# Patient Record
Sex: MALE | Race: BLACK OR AFRICAN AMERICAN | NOT HISPANIC OR LATINO
[De-identification: names, ages, dates, MRNs, and addresses within clinical notes are randomized per-mention and may not be internally consistent; named-entity substitution may affect disease eponyms.]

---

## 2017-08-09 ENCOUNTER — APPOINTMENT (OUTPATIENT)
Dept: CARDIOLOGY | Facility: CLINIC | Age: 62
End: 2017-08-09

## 2021-08-02 ENCOUNTER — APPOINTMENT (OUTPATIENT)
Dept: INTERNAL MEDICINE | Facility: CLINIC | Age: 66
End: 2021-08-02
Payer: MEDICAID

## 2021-08-04 ENCOUNTER — APPOINTMENT (OUTPATIENT)
Dept: INTERNAL MEDICINE | Facility: CLINIC | Age: 66
End: 2021-08-04
Payer: MEDICAID

## 2021-08-04 ENCOUNTER — LABORATORY RESULT (OUTPATIENT)
Age: 66
End: 2021-08-04

## 2021-08-04 ENCOUNTER — NON-APPOINTMENT (OUTPATIENT)
Age: 66
End: 2021-08-04

## 2021-08-04 VITALS
TEMPERATURE: 97 F | HEIGHT: 75 IN | BODY MASS INDEX: 22.5 KG/M2 | DIASTOLIC BLOOD PRESSURE: 84 MMHG | RESPIRATION RATE: 17 BRPM | HEART RATE: 81 BPM | OXYGEN SATURATION: 96 % | SYSTOLIC BLOOD PRESSURE: 136 MMHG | WEIGHT: 181 LBS

## 2021-08-04 DIAGNOSIS — N17.9 ACUTE KIDNEY FAILURE, UNSPECIFIED: ICD-10-CM

## 2021-08-04 DIAGNOSIS — E66.3 OVERWEIGHT: ICD-10-CM

## 2021-08-04 DIAGNOSIS — Z00.00 ENCOUNTER FOR GENERAL ADULT MEDICAL EXAMINATION W/OUT ABNORMAL FINDINGS: ICD-10-CM

## 2021-08-04 DIAGNOSIS — Z71.89 OTHER SPECIFIED COUNSELING: ICD-10-CM

## 2021-08-04 DIAGNOSIS — B19.20 UNSPECIFIED VIRAL HEPATITIS C W/OUT HEPATIC COMA: ICD-10-CM

## 2021-08-04 DIAGNOSIS — Z13.39 ENCOUNTER FOR SCREENING EXAM FOR OTHER MENTAL HEALTH AND BEHAVIORAL DISORDERS: ICD-10-CM

## 2021-08-04 DIAGNOSIS — Z80.9 FAMILY HISTORY OF MALIGNANT NEOPLASM, UNSPECIFIED: ICD-10-CM

## 2021-08-04 DIAGNOSIS — G62.9 POLYNEUROPATHY, UNSPECIFIED: ICD-10-CM

## 2021-08-04 DIAGNOSIS — Z87.828 PERSONAL HISTORY OF OTHER (HEALED) PHYSICAL INJURY AND TRAUMA: ICD-10-CM

## 2021-08-04 DIAGNOSIS — M54.30 SCIATICA, UNSPECIFIED SIDE: ICD-10-CM

## 2021-08-04 DIAGNOSIS — Z81.8 FAMILY HISTORY OF OTHER MENTAL AND BEHAVIORAL DISORDERS: ICD-10-CM

## 2021-08-04 DIAGNOSIS — F17.200 NICOTINE DEPENDENCE, UNSPECIFIED, UNCOMPLICATED: ICD-10-CM

## 2021-08-04 PROCEDURE — 99214 OFFICE O/P EST MOD 30 MIN: CPT | Mod: 25

## 2021-08-04 PROCEDURE — G0445: CPT | Mod: 59

## 2021-08-04 PROCEDURE — 99397 PER PM REEVAL EST PAT 65+ YR: CPT | Mod: 25

## 2021-08-04 PROCEDURE — G0442 ANNUAL ALCOHOL SCREEN 15 MIN: CPT

## 2021-08-04 PROCEDURE — G0442 ANNUAL ALCOHOL SCREEN 15 MIN: CPT | Mod: 59

## 2021-08-04 PROCEDURE — 93000 ELECTROCARDIOGRAM COMPLETE: CPT | Mod: 59

## 2021-08-04 RX ORDER — CHROMIUM 200 MCG
TABLET ORAL DAILY
Refills: 0 | Status: ACTIVE | COMMUNITY

## 2021-08-04 RX ORDER — BETAMETHASONE DIPROPIONATE 0.5 MG/G
0.05 CREAM TOPICAL TWICE DAILY
Refills: 0 | Status: ACTIVE | COMMUNITY

## 2021-08-04 RX ORDER — HALOBETASOL PROPIONATE 0.5 MG/G
0.05 CREAM TOPICAL TWICE DAILY
Refills: 0 | Status: ACTIVE | COMMUNITY

## 2021-08-05 ENCOUNTER — NON-APPOINTMENT (OUTPATIENT)
Age: 66
End: 2021-08-05

## 2021-08-05 LAB
25(OH)D3 SERPL-MCNC: 28 NG/ML
ALBUMIN SERPL ELPH-MCNC: 4.8 G/DL
ALP BLD-CCNC: 79 U/L
ALT SERPL-CCNC: 14 U/L
ANION GAP SERPL CALC-SCNC: 17 MMOL/L
AST SERPL-CCNC: 31 U/L
BASOPHILS # BLD AUTO: 0.09 K/UL
BASOPHILS NFR BLD AUTO: 1.2 %
BILIRUB SERPL-MCNC: 0.8 MG/DL
BUN SERPL-MCNC: 38 MG/DL
C TRACH RRNA SPEC QL NAA+PROBE: NOT DETECTED
CALCIUM SERPL-MCNC: 10.4 MG/DL
CHLORIDE SERPL-SCNC: 96 MMOL/L
CHOLEST SERPL-MCNC: 198 MG/DL
CO2 SERPL-SCNC: 26 MMOL/L
CREAT SERPL-MCNC: 2.31 MG/DL
EOSINOPHIL # BLD AUTO: 0.1 K/UL
EOSINOPHIL NFR BLD AUTO: 1.3 %
ESTIMATED AVERAGE GLUCOSE: 114 MG/DL
GGT SERPL-CCNC: 20 U/L
GLUCOSE SERPL-MCNC: 84 MG/DL
HBA1C MFR BLD HPLC: 5.6 %
HCT VFR BLD CALC: 38.8 %
HDLC SERPL-MCNC: 82 MG/DL
HGB BLD-MCNC: 13.8 G/DL
HSV 1+2 IGG SER IA-IMP: POSITIVE
HSV 1+2 IGG SER IA-IMP: POSITIVE
HSV1 IGG SER QL: 31.8 INDEX
HSV2 IGG SER QL: 1.74 INDEX
IMM GRANULOCYTES NFR BLD AUTO: 0.1 %
LDLC SERPL CALC-MCNC: 99 MG/DL
LYMPHOCYTES # BLD AUTO: 2.34 K/UL
LYMPHOCYTES NFR BLD AUTO: 30.6 %
MAN DIFF?: NORMAL
MCHC RBC-ENTMCNC: 29.3 PG
MCHC RBC-ENTMCNC: 35.6 GM/DL
MCV RBC AUTO: 82.4 FL
MONOCYTES # BLD AUTO: 1.02 K/UL
MONOCYTES NFR BLD AUTO: 13.4 %
N GONORRHOEA RRNA SPEC QL NAA+PROBE: NOT DETECTED
NEUTROPHILS # BLD AUTO: 4.08 K/UL
NEUTROPHILS NFR BLD AUTO: 53.4 %
NONHDLC SERPL-MCNC: 116 MG/DL
PLATELET # BLD AUTO: 237 K/UL
POTASSIUM SERPL-SCNC: 3.8 MMOL/L
PROT SERPL-MCNC: 7.3 G/DL
RBC # BLD: 4.71 M/UL
RBC # FLD: 14.5 %
SODIUM SERPL-SCNC: 140 MMOL/L
SOURCE AMPLIFICATION: NORMAL
SOURCE AMPLIFICATION: NORMAL
T PALLIDUM AB SER QL IA: NEGATIVE
T VAGINALIS RRNA SPEC QL NAA+PROBE: NOT DETECTED
TRIGL SERPL-MCNC: 89 MG/DL
TSH SERPL-ACNC: 1.05 UIU/ML
WBC # FLD AUTO: 7.64 K/UL

## 2021-08-06 LAB
APPEARANCE: ABNORMAL
BILIRUBIN URINE: NEGATIVE
BLOOD URINE: NEGATIVE
COLOR: YELLOW
GLUCOSE QUALITATIVE U: NEGATIVE
HBV SURFACE AB SER QL: REACTIVE
HBV SURFACE AG SER QL: NONREACTIVE
HCV AB SER QL: REACTIVE
HCV S/CO RATIO: 7.92 S/CO
HIV1+2 AB SPEC QL IA.RAPID: NONREACTIVE
KETONES URINE: NORMAL
LEUKOCYTE ESTERASE URINE: NEGATIVE
NITRITE URINE: NEGATIVE
PH URINE: 6
PROTEIN URINE: ABNORMAL
SPECIFIC GRAVITY URINE: 1.02
UROBILINOGEN URINE: NORMAL

## 2021-08-07 NOTE — ASSESSMENT
[FreeTextEntry1] : Medical Annual wellness visit completed:\par HRA completed and reviewed with patient\par Medical, family, surgical history reviewed with patient and updated\par List of current providers r/w patient and updated\par Vitals, BMI reviewed and discussed along with healthy BMI goals. Dietary counseling x 15 minutes provided\par Depression PHQ 9 completed and reviewed \par Annual safety assessment reviewed\par discussed advanced directives\par smoking cessation counseling provided\par Established routine screening and immunization schedules\par \par VACCINATION & OTHER TX RECOMMENDATIONS\par \par ASA preventative therapy\par Calcium/Vitamin D supplementation\par  \par Dietary counseling, nutrition referral\par risks vs. benefits d/w patient. routine vaccination and vaccination schedules and recommendation d/w patient\par \par Vaccines recommended: \par * pneumovax (once after 65) & prevnar\par * annual Influenza vaccine\par * Hep B vaccines\par * zostavax\par * Tdap\par \par Colorectal screening recommended; screening colonoscopy q10yr, flex sig q5yr, annual fecal occult testing\par BMD recommended biennially for osteoporosis screening\par Glaucoma screening recommended, annual optho evals\par Cardiovascular screening and blood tests recommended and discussed w/ patient, cholesterol screening and dietary counseling\par AAA recommended x 1\par \par \par DIABETIC recommendations:\par med nutrition counseling, nutrition referral \par annual podiatry evaluations and follow up\par annual optho eval/retinal exams\par routine blood tets, including FBS, a1c% and cholesterol panels\par \par \par Met with DOMENICA SAPP, who was willing to discuss advance care planning. \par Our advance care planning conversation included a discussion about:\par 1. The value and importance of advance care planning.\par 2. Experiences with loved ones who have been seriously ill or have .\par 3. Exploration of personal, cultural, or spiritual beliefs that might influence medical decisions.\par 4. Exploration of goals of care in the event of a sudden injury or illness.\par 5. Identification of a health care agent.\par 6. Review and update, or completion of, an advance directive.\par Start time: 125 End time: 140\par \par diet and exercise weight loss.  Low-salt low-fat ADA diet/ htn- Discussed diabetes physiology\par - Discussed importance of monitoring blood glucose levels\par - Encouraged a low fat/low cholesterol diet\par - Discussed symptoms of hyperglycemia and hypoglycemia\par - Discussed ADA glucose goals\par - Discussed  HGB A1c and the effects of blood glucose on the level\par - Discussed Healthy eating, avoidance of concentrated sweets, and to include vegetables by at least 2 meals a day\par - Discussed regular exercise\par - Discussed importance of follow up physician visits Limit intake of Sodium (Salt) to less than 2 grams a day to prevent fluid retention-swelling or worsening of symptoms. The importance of keeping the blood pressure at or below 130/80 to prevent stroke, heart attacks, kidney failure, blindness, and loss of limbs was  low chol diet. Avoid fried foods, red meat, butter, eggs, hard cheeses. Use canola or olive oil preferred. ::  was established in which goals would be set, monitoring would be done, and problem solving would also be addressed. The patient would be assisted using behavior change techniques, such as self-help and counseling through behavioral modification: Problem solving using hypnosis and positive medical reinforcement to achieve agreed-upon goals.\par \par Symptomatic patients : Test for influenza, if positive, treat for influenza and do not continue below. \par 1. Fever plus cough or shortness of breath : Test for RVP and COVID-19.\par 2.Indirect, circumstantial or unclear exposure to COVID-19, or other concerning cases not meeting above criteria: Please call [facility] to discuss testing. \par +++ All above cases must be reported to the [facility] registry. +++\par \par Asymptomatic patients: \par 1. Known first-degree direct-contact exposure to positive COVID-19 patient but asymptomatic: No testing PLUS 14 day self-quarantine. Pt to call if symptoms develop. Report to [facility] Registry.\par 2. No known exposure and asymptomatic, referred from outside healthcare organization: Please call [facility] to discuss testing. \par 3.All other asymptomatic patients with no known exposures: no testing, no exceptions.\par \par \par

## 2021-08-07 NOTE — COUNSELING
[Risk of tobacco use and health benefits of smoking cessation discussed] : Risk of tobacco use and health benefits of smoking cessation discussed [Cessation strategies including cessation program discussed] : Cessation strategies including cessation program discussed [Use of nicotine replacement therapies and other medications discussed] : Use of nicotine replacement therapies and other medications discussed [Encouraged to pick a quit date and identify support needed to quit] : Encouraged to pick a quit date and identify support needed to quit [Tobacco Use Cessation Intermediate Greater Than 3 Minutes Up to 10 Minutes] : Tobacco Use Cessation Intermediate Greater Than 3 Minutes Up to 10 Minutes [Patient motivation] : Patient motivation [Good understanding] : Patient has a good understanding of lifestyle changes and steps needed to achieve self management goal [Willing to Quit Smoking] : Not willing to quit smoking [FreeTextEntry1] : 10

## 2021-08-07 NOTE — HISTORY OF PRESENT ILLNESS
[Spouse] : spouse [FreeTextEntry1] : f/u, GHM, annual wellness, gluc intol, obesity, htn, lipids.  Patient recently admitted to the hospital and found to have pyelonephritis and acute renal failure, patient and wife state that he has lost excessive weight is fatigued and having difficulty with mentation [de-identified] : 64 y/o M presents for f/u, annual wellness, GHM\par \par Hx of Pyelonephritis/Renal failure in June 2021\par Pt has also been noting decreased appetite.\par Also notes R lower back pain, no trauma.  Weight loss change in mental status fatigue.  Has fever chills cough chest pain or shortness of breath\par \par Pt has hx of lipids, htn, glucose intolerance\par On Losartan/HCTZ\par Pt denies fever, cough, body aches, chills and SOB.\par Denies chest pain.\par \par ROS as documented below.\par Voices no further complaints.

## 2021-08-07 NOTE — HEALTH RISK ASSESSMENT
[20 or more] : 20 or more [Yes] : Yes [2 - 4 times a month (2 pts)] : 2-4 times a month (2 points) [1 or 2 (0 pts)] : 1 or 2 (0 points) [Never (0 pts)] : Never (0 points) [No] : In the past 12 months have you used drugs other than those required for medical reasons? No [No falls in past year] : Patient reported no falls in the past year [0] : 1) Little interest or pleasure doing things: Not at all (0) [1] : 2) Feeling down, depressed, or hopeless for several days (1) [PHQ-2 Negative - No further assessment needed] : PHQ-2 Negative - No further assessment needed [I have developed a follow-up plan documented below in the note.] : I have developed a follow-up plan documented below in the note. [Patient declined colonoscopy] : Patient declined colonoscopy [None] : None [With Significant Other] : lives with significant other [Retired] : retired [High School] : high school [] :  [Sexually Active] : sexually active [Fully functional (bathing, dressing, toileting, transferring, walking, feeding)] : Fully functional (bathing, dressing, toileting, transferring, walking, feeding) [Fully functional (using the telephone, shopping, preparing meals, housekeeping, doing laundry, using] : Fully functional and needs no help or supervision to perform IADLs (using the telephone, shopping, preparing meals, housekeeping, doing laundry, using transportation, managing medications and managing finances) [With Patient/Caregiver] : , with patient/caregiver [Designated Healthcare Proxy] : Designated healthcare proxy [Name: ___] : Health Care Proxy's Name: [unfilled]  [Relationship: ___] : Relationship: [unfilled] [Last Verification Date: ___] : Last Verification Date: [unfilled] [I will adhere to the patient's wishes.] : I will adhere to the patient's wishes. [Time Spent: ___ minutes] : Time Spent: [unfilled] minutes [Poor] : ~his/her~ mood as  poor [Intercurrent ED visits] : went to ED [Intercurrent hospitalizations] : was admitted to the hospital  [] : No [de-identified] : Hospitalized for pyelonephritis in June of this year [Audit-CScore] : 2 [de-identified] : None [de-identified] : Standard,  [[id number]] : 1 [Hepatitis C test offered] : Hepatitis C test offered [Change in mental status noted] : Change in mental status noted [Language] : denies difficulty with language [Behavior] : denies difficulty with behavior [Learning/Retaining New Information] : denies difficulty learning/retaining new information [Handling Complex Tasks] : denies difficulty handling complex tasks [Reasoning] : denies difficulty with reasoning [Spatial Ability and Orientation] : denies difficulty with spatial ability and orientation [Unemployed] : unemployed [High Risk Behavior] : no high risk behavior [Feels Safe at Home] : Feels safe at home [Reports changes in hearing] : Reports no changes in hearing [Reports changes in vision] : Reports no changes in vision [Reports changes in dental health] : Reports no changes in dental health [Carbon Monoxide Detector] : carbon monoxide detector [Guns at Home] : no guns at home [Safety elements used in home] : safety elements used in home [Seat Belt] :  uses seat belt [Sunscreen] : uses sunscreen [Travel to Developing Areas] : does not  travel to developing areas [TB Exposure] : is not being exposed to tuberculosis [Caregiver Concerns] : does not have caregiver concerns [ColonoscopyComments] : not UTD [HepatitisCDate] : ? [AdvancecareDate] : 08/21

## 2021-08-17 ENCOUNTER — APPOINTMENT (OUTPATIENT)
Dept: PHYSICAL MEDICINE AND REHAB | Facility: CLINIC | Age: 66
End: 2021-08-17
Payer: MEDICAID

## 2021-08-17 VITALS
HEIGHT: 75 IN | WEIGHT: 185.5 LBS | SYSTOLIC BLOOD PRESSURE: 126 MMHG | BODY MASS INDEX: 23.06 KG/M2 | DIASTOLIC BLOOD PRESSURE: 76 MMHG | RESPIRATION RATE: 16 BRPM | OXYGEN SATURATION: 95 % | TEMPERATURE: 97.1 F | HEART RATE: 77 BPM

## 2021-08-17 DIAGNOSIS — M79.671 PAIN IN RIGHT FOOT: ICD-10-CM

## 2021-08-17 DIAGNOSIS — M25.562 PAIN IN LEFT KNEE: ICD-10-CM

## 2021-08-17 PROCEDURE — 99205 OFFICE O/P NEW HI 60 MIN: CPT

## 2021-08-19 PROBLEM — M25.562 LEFT KNEE PAIN, UNSPECIFIED CHRONICITY: Status: ACTIVE | Noted: 2021-08-17

## 2021-08-19 PROBLEM — M79.671 ACUTE FOOT PAIN, RIGHT: Status: ACTIVE | Noted: 2021-08-17

## 2021-08-19 NOTE — REVIEW OF SYSTEMS
[Joint Pain] : joint pain [Joint Stiffness] : joint stiffness [Muscle Pain] : muscle pain [Muscle Weakness] : muscle weakness [Fever] : no fever [Eye Pain] : no eye pain [Earache] : no earache [Chest Pain] : no chest pain [Shortness Of Breath] : no shortness of breath [Abdominal Pain] : no abdominal pain [Dysuria] : no dysuria [Skin Wound] : no skin wound [Dizziness] : no dizziness [Insomnia] : no insomnia [Easy Bruising] : no tendency for easy bruising

## 2021-08-19 NOTE — REASON FOR VISIT
[Initial Evaluation] : an initial evaluation [FreeTextEntry1] : cervical, low back pain and right lateral foot and left knee pain
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
## 2021-08-19 NOTE — PHYSICAL EXAM
[FreeTextEntry1] : Pleasant, in no distress. Language: English\par HEENT: Head: no trauma. Eyes: no discharge. Ears: No discharge. Nose No discharge. Throat: clear\par Neck: AROM 0-60. spasm in the par cervical muscles Negative Spurlings\par Heart: RR, +S1, S2\par Lungs: CTA\par Abdomen: soft, NT\par Lumbar spine: FAROM, mild spasm over the right low back and SI joint\par \par LUE: Shoulder:FAROM, MS 5/5\par Elbow: FAROM, MS 5/5 reflexes 2/4\par Wrist: FAROM, MS 5/5 reflexes 2/4\par Warm, nontender, pulse 2+\par \par RUE:Shoulder:FAROM, MS 5/5\par Elbow: FAROM, MS 5/5 reflexes 2/4\par Wrist: FAROM, MS 5/5 reflexes 2/4\par Warm, nontender, pulse 2+\par \par LLE: Hip: FAROM, MS 5/5\par Knee: FAROM, MS 4/5  + grind. No instability. Neg mcmurays.\par reflexes 2/4\par Ankle: FAROM, MS 5/5 reflexes 2/4\par Warm , nontender, pulse 2+ negative homans\par \par RLE: Hip: FAROM, MS 5/5\par Knee: FAROM, MS 5/5 reflexes 2/4\par Ankle: FAROM, MS 5/5 reflexes 2/4\par Warm , nontender, pulse 2+ negative homans\par Very tender over the right lateral 5th MT. No rash\par \par Gait: Spontaneous, reciprocal, safe without an assistive device\par \par Sensation\par RUE: sensation is intact to light touch, pinprick  and proprioception\par LUE: sensation is intact to light touch, pinprick  and proprioception\par RLE: sensation is intact to light touch, pinprick  and proprioception. Neg SLR. Neg KIT, Neg FADIR\par LLE: sensation is intact to light touch, pinprick  and proprioception. Neg SLR. Neg KIT, Neg FADIR\par \par

## 2021-09-14 ENCOUNTER — APPOINTMENT (OUTPATIENT)
Dept: INTERNAL MEDICINE | Facility: CLINIC | Age: 66
End: 2021-09-14
Payer: MEDICAID

## 2021-09-14 VITALS
OXYGEN SATURATION: 98 % | BODY MASS INDEX: 21.88 KG/M2 | SYSTOLIC BLOOD PRESSURE: 126 MMHG | DIASTOLIC BLOOD PRESSURE: 72 MMHG | WEIGHT: 176 LBS | HEART RATE: 67 BPM | RESPIRATION RATE: 17 BRPM | HEIGHT: 75 IN | TEMPERATURE: 97.3 F

## 2021-09-14 DIAGNOSIS — R55 SYNCOPE AND COLLAPSE: ICD-10-CM

## 2021-09-14 DIAGNOSIS — R43.0 ANOSMIA: ICD-10-CM

## 2021-09-14 DIAGNOSIS — R63.0 ANOREXIA: ICD-10-CM

## 2021-09-14 PROCEDURE — 99214 OFFICE O/P EST MOD 30 MIN: CPT | Mod: 25

## 2021-09-20 PROBLEM — R55 SYNCOPE AND COLLAPSE: Status: ACTIVE | Noted: 2021-09-14

## 2021-09-20 PROBLEM — R63.0 POOR APPETITE: Status: ACTIVE | Noted: 2021-09-20

## 2021-09-20 PROBLEM — R43.0 ANOSMIA: Status: ACTIVE | Noted: 2021-09-20

## 2021-09-20 LAB
ALBUMIN SERPL ELPH-MCNC: 4.5 G/DL
ALP BLD-CCNC: 67 U/L
ALT SERPL-CCNC: 14 U/L
ANION GAP SERPL CALC-SCNC: 15 MMOL/L
AST SERPL-CCNC: 25 U/L
BASOPHILS # BLD AUTO: 0.08 K/UL
BASOPHILS NFR BLD AUTO: 1.2 %
BILIRUB SERPL-MCNC: 0.4 MG/DL
BUN SERPL-MCNC: 37 MG/DL
CALCIUM SERPL-MCNC: 10.3 MG/DL
CHLORIDE SERPL-SCNC: 98 MMOL/L
CO2 SERPL-SCNC: 26 MMOL/L
CREAT SERPL-MCNC: 2 MG/DL
EOSINOPHIL # BLD AUTO: 0.22 K/UL
EOSINOPHIL NFR BLD AUTO: 3.2 %
GLUCOSE SERPL-MCNC: 94 MG/DL
HCT VFR BLD CALC: 34.8 %
HGB BLD-MCNC: 12.5 G/DL
IMM GRANULOCYTES NFR BLD AUTO: 0.1 %
LYMPHOCYTES # BLD AUTO: 2.36 K/UL
LYMPHOCYTES NFR BLD AUTO: 34.6 %
MAN DIFF?: NORMAL
MCHC RBC-ENTMCNC: 29.5 PG
MCHC RBC-ENTMCNC: 35.9 GM/DL
MCV RBC AUTO: 82.1 FL
MONOCYTES # BLD AUTO: 1.05 K/UL
MONOCYTES NFR BLD AUTO: 15.4 %
NEUTROPHILS # BLD AUTO: 3.11 K/UL
NEUTROPHILS NFR BLD AUTO: 45.5 %
PLATELET # BLD AUTO: 235 K/UL
POTASSIUM SERPL-SCNC: 3.9 MMOL/L
PROT SERPL-MCNC: 7 G/DL
RBC # BLD: 4.24 M/UL
RBC # FLD: 13.9 %
SARS-COV-2 N GENE NPH QL NAA+PROBE: NOT DETECTED
SODIUM SERPL-SCNC: 139 MMOL/L
WBC # FLD AUTO: 6.83 K/UL

## 2021-09-20 NOTE — PHYSICAL EXAM
[No Acute Distress] : no acute distress [Well Nourished] : well nourished [Well Developed] : well developed [Well-Appearing] : well-appearing [Normal Sclera/Conjunctiva] : normal sclera/conjunctiva [EOMI] : extraocular movements intact [PERRL] : pupils equal round and reactive to light [Normal Outer Ear/Nose] : the outer ears and nose were normal in appearance [Normal Oropharynx] : the oropharynx was normal [No JVD] : no jugular venous distention [No Lymphadenopathy] : no lymphadenopathy [Supple] : supple [Thyroid Normal, No Nodules] : the thyroid was normal and there were no nodules present [No Respiratory Distress] : no respiratory distress  [No Accessory Muscle Use] : no accessory muscle use [Clear to Auscultation] : lungs were clear to auscultation bilaterally [Normal Rate] : normal rate  [Regular Rhythm] : with a regular rhythm [Normal S1, S2] : normal S1 and S2 [No Murmur] : no murmur heard [No Abdominal Bruit] : a ~M bruit was not heard ~T in the abdomen [No Carotid Bruits] : no carotid bruits [No Varicosities] : no varicosities [Pedal Pulses Present] : the pedal pulses are present [No Edema] : there was no peripheral edema [No Palpable Aorta] : no palpable aorta [Soft] : abdomen soft [No Extremity Clubbing/Cyanosis] : no extremity clubbing/cyanosis [Non Tender] : non-tender [Non-distended] : non-distended [No Masses] : no abdominal mass palpated [No HSM] : no HSM [Normal Bowel Sounds] : normal bowel sounds [Normal Posterior Cervical Nodes] : no posterior cervical lymphadenopathy [Normal Anterior Cervical Nodes] : no anterior cervical lymphadenopathy [No CVA Tenderness] : no CVA  tenderness [No Spinal Tenderness] : no spinal tenderness [No Joint Swelling] : no joint swelling [Grossly Normal Strength/Tone] : grossly normal strength/tone [Coordination Grossly Intact] : coordination grossly intact [No Rash] : no rash [No Focal Deficits] : no focal deficits [Normal Gait] : normal gait [Deep Tendon Reflexes (DTR)] : deep tendon reflexes were 2+ and symmetric [Normal Affect] : the affect was normal [Normal Insight/Judgement] : insight and judgment were intact

## 2021-09-21 ENCOUNTER — APPOINTMENT (OUTPATIENT)
Dept: PHYSICAL MEDICINE AND REHAB | Facility: CLINIC | Age: 66
End: 2021-09-21

## 2021-09-21 NOTE — REVIEW OF SYSTEMS
[Fainting] : fainting [Fever] : no fever [Chills] : no chills [Recent Change In Weight] : ~T no recent weight change [Vision Problems] : no vision problems [Earache] : no earache [Nasal Discharge] : no nasal discharge [Sore Throat] : no sore throat [Chest Pain] : no chest pain [Palpitations] : no palpitations [Shortness Of Breath] : no shortness of breath [Wheezing] : no wheezing [Abdominal Pain] : no abdominal pain [Nausea] : no nausea [Diarrhea] : no diarrhea [Vomiting] : no vomiting [Dysuria] : no dysuria [Hesitancy] : no hesitancy [Hematuria] : no hematuria [Frequency] : no frequency [Joint Pain] : no joint pain [Joint Swelling] : no joint swelling [Skin Rash] : no skin rash [Headache] : no headache [Dizziness] : no dizziness [Anxiety] : no anxiety [Depression] : no depression [Swollen Glands] : no swollen glands [FreeTextEntry2] : poor appetite, loss of smell

## 2021-09-21 NOTE — HISTORY OF PRESENT ILLNESS
[FreeTextEntry1] : follow up [de-identified] : 64 y/o male presents with concerns for poor appetite. He states he has also experienced a loss of smell. Patient reports a recent episode of syncope last week. He states the episode was sudden and there was no presyncopal dizziness reported. He reports that he just woke up on the ground. He feels otherwise well  and offers no other acute complaints or concerns. \par \par

## 2021-09-23 ENCOUNTER — NON-APPOINTMENT (OUTPATIENT)
Age: 66
End: 2021-09-23

## 2021-09-24 ENCOUNTER — NON-APPOINTMENT (OUTPATIENT)
Age: 66
End: 2021-09-24

## 2021-09-29 ENCOUNTER — APPOINTMENT (OUTPATIENT)
Dept: INTERNAL MEDICINE | Facility: CLINIC | Age: 66
End: 2021-09-29
Payer: MEDICAID

## 2021-09-29 VITALS
OXYGEN SATURATION: 98 % | HEART RATE: 65 BPM | TEMPERATURE: 97.4 F | SYSTOLIC BLOOD PRESSURE: 122 MMHG | WEIGHT: 180 LBS | RESPIRATION RATE: 16 BRPM | DIASTOLIC BLOOD PRESSURE: 84 MMHG | BODY MASS INDEX: 22.38 KG/M2 | HEIGHT: 75 IN

## 2021-09-29 DIAGNOSIS — R20.0 ANESTHESIA OF SKIN: ICD-10-CM

## 2021-09-29 DIAGNOSIS — Z09 ENCOUNTER FOR FOLLOW-UP EXAMINATION AFTER COMPLETED TREATMENT FOR CONDITIONS OTHER THAN MALIGNANT NEOPLASM: ICD-10-CM

## 2021-09-29 PROCEDURE — 99496 TRANSJ CARE MGMT HIGH F2F 7D: CPT

## 2021-09-29 PROCEDURE — 99214 OFFICE O/P EST MOD 30 MIN: CPT | Mod: 25

## 2021-10-05 PROBLEM — R20.0 NUMBNESS: Status: ACTIVE | Noted: 2021-09-29

## 2021-10-05 PROBLEM — Z09 HOSPITAL DISCHARGE FOLLOW-UP: Status: ACTIVE | Noted: 2021-10-05

## 2021-10-05 NOTE — REVIEW OF SYSTEMS
[Fever] : no fever [Chills] : no chills [Recent Change In Weight] : ~T no recent weight change [Vision Problems] : no vision problems [Earache] : no earache [Nasal Discharge] : no nasal discharge [Sore Throat] : no sore throat [Chest Pain] : no chest pain [Palpitations] : no palpitations [Shortness Of Breath] : no shortness of breath [Wheezing] : no wheezing [Abdominal Pain] : no abdominal pain [Nausea] : no nausea [Diarrhea] : no diarrhea [Vomiting] : no vomiting [Dysuria] : no dysuria [Hesitancy] : no hesitancy [Hematuria] : no hematuria [Frequency] : no frequency [Joint Pain] : no joint pain [Joint Swelling] : no joint swelling [Skin Rash] : no skin rash [Headache] : no headache [Dizziness] : no dizziness [Fainting] : no fainting [Anxiety] : no anxiety [Depression] : no depression [Swollen Glands] : no swollen glands

## 2021-10-05 NOTE — HISTORY OF PRESENT ILLNESS
[FreeTextEntry2] : 64 y/o male presents for hospital discharge follow up. He states he was having numbness in left hand/ thumb and on the left side of face. CVA workup was negative. Vertebral artery occlusion and mild carotid stenosis were noted on imaging. Patient states he has also not yet followed up with a renal specialist and plans to schedule an appointment. He feels otherwise well and offers no other acute complaints or concerns.

## 2021-10-29 ENCOUNTER — RX RENEWAL (OUTPATIENT)
Age: 66
End: 2021-10-29

## 2022-02-04 ENCOUNTER — RX RENEWAL (OUTPATIENT)
Age: 67
End: 2022-02-04

## 2022-02-09 ENCOUNTER — APPOINTMENT (OUTPATIENT)
Dept: INTERNAL MEDICINE | Facility: CLINIC | Age: 67
End: 2022-02-09

## 2022-03-04 ENCOUNTER — RX RENEWAL (OUTPATIENT)
Age: 67
End: 2022-03-04

## 2022-03-08 ENCOUNTER — RX RENEWAL (OUTPATIENT)
Age: 67
End: 2022-03-08

## 2022-03-23 ENCOUNTER — APPOINTMENT (OUTPATIENT)
Dept: INTERNAL MEDICINE | Facility: CLINIC | Age: 67
End: 2022-03-23
Payer: MEDICAID

## 2022-03-23 VITALS
WEIGHT: 182.5 LBS | HEIGHT: 75 IN | TEMPERATURE: 97.8 F | RESPIRATION RATE: 17 BRPM | SYSTOLIC BLOOD PRESSURE: 116 MMHG | HEART RATE: 78 BPM | DIASTOLIC BLOOD PRESSURE: 80 MMHG | OXYGEN SATURATION: 98 % | BODY MASS INDEX: 22.69 KG/M2

## 2022-03-23 DIAGNOSIS — N28.9 DISORDER OF KIDNEY AND URETER, UNSPECIFIED: ICD-10-CM

## 2022-03-23 DIAGNOSIS — I65.09 OCCLUSION AND STENOSIS OF UNSPECIFIED VERTEBRAL ARTERY: ICD-10-CM

## 2022-03-23 DIAGNOSIS — H10.9 UNSPECIFIED CONJUNCTIVITIS: ICD-10-CM

## 2022-03-23 PROCEDURE — 99214 OFFICE O/P EST MOD 30 MIN: CPT

## 2022-03-23 RX ORDER — NEOMYCIN SULFATE, POLYMYXIN B SULFATE AND DEXAMETHASONE 3.5; 10000; 1 MG/ML; [USP'U]/ML; MG/ML
3.5-10000-0.1 SUSPENSION OPHTHALMIC 3 TIMES DAILY
Qty: 1 | Refills: 0 | Status: ACTIVE | COMMUNITY
Start: 2022-03-23 | End: 1900-01-01

## 2022-03-23 NOTE — HISTORY OF PRESENT ILLNESS
[FreeTextEntry1] : follow up [de-identified] : 67 y/o male presents for follow up. Patient has been having more frequent GERD. He also recently saw a Nephrologist for decreased renal function. Patient has a history of vertebral artery occlusion. Wife reports he has also had some recent memory loss. He has also been having left eye redness over the past few days. He feels otherwise well and offers no other acute complaints or concerns. ROS as documented below.

## 2022-03-23 NOTE — REVIEW OF SYSTEMS
[Redness] : redness [Heartburn] : heartburn [Memory Loss] : memory loss [Fever] : no fever [Chills] : no chills [Recent Change In Weight] : ~T no recent weight change [Vision Problems] : no vision problems [Earache] : no earache [Nasal Discharge] : no nasal discharge [Sore Throat] : no sore throat [Chest Pain] : no chest pain [Palpitations] : no palpitations [Shortness Of Breath] : no shortness of breath [Wheezing] : no wheezing [Abdominal Pain] : no abdominal pain [Nausea] : no nausea [Diarrhea] : no diarrhea [Vomiting] : no vomiting [Dysuria] : no dysuria [Hesitancy] : no hesitancy [Hematuria] : no hematuria [Frequency] : no frequency [Joint Pain] : no joint pain [Joint Swelling] : no joint swelling [Skin Rash] : no skin rash [Headache] : no headache [Dizziness] : no dizziness [Fainting] : no fainting [Anxiety] : no anxiety [Depression] : no depression [Swollen Glands] : no swollen glands

## 2022-04-04 ENCOUNTER — RX RENEWAL (OUTPATIENT)
Age: 67
End: 2022-04-04

## 2022-05-04 ENCOUNTER — RX RENEWAL (OUTPATIENT)
Age: 67
End: 2022-05-04

## 2022-06-06 ENCOUNTER — NON-APPOINTMENT (OUTPATIENT)
Age: 67
End: 2022-06-06

## 2022-07-21 ENCOUNTER — APPOINTMENT (OUTPATIENT)
Dept: INTERNAL MEDICINE | Facility: CLINIC | Age: 67
End: 2022-07-21

## 2022-07-21 ENCOUNTER — NON-APPOINTMENT (OUTPATIENT)
Age: 67
End: 2022-07-21

## 2022-07-21 ENCOUNTER — LABORATORY RESULT (OUTPATIENT)
Age: 67
End: 2022-07-21

## 2022-07-21 VITALS
BODY MASS INDEX: 20.39 KG/M2 | DIASTOLIC BLOOD PRESSURE: 74 MMHG | HEART RATE: 83 BPM | RESPIRATION RATE: 17 BRPM | SYSTOLIC BLOOD PRESSURE: 118 MMHG | HEIGHT: 75 IN | TEMPERATURE: 97.4 F | WEIGHT: 164 LBS | OXYGEN SATURATION: 96 %

## 2022-07-21 DIAGNOSIS — R97.20 ELEVATED PROSTATE, SPECIFIC ANTIGEN [PSA]: ICD-10-CM

## 2022-07-21 DIAGNOSIS — I73.9 PERIPHERAL VASCULAR DISEASE, UNSPECIFIED: ICD-10-CM

## 2022-07-21 DIAGNOSIS — R63.4 ABNORMAL WEIGHT LOSS: ICD-10-CM

## 2022-07-21 PROCEDURE — 93000 ELECTROCARDIOGRAM COMPLETE: CPT

## 2022-07-21 PROCEDURE — 99401 PREV MED CNSL INDIV APPRX 15: CPT

## 2022-07-21 PROCEDURE — G0439: CPT

## 2022-07-21 PROCEDURE — 36415 COLL VENOUS BLD VENIPUNCTURE: CPT

## 2022-07-21 PROCEDURE — 99214 OFFICE O/P EST MOD 30 MIN: CPT | Mod: 25

## 2022-07-26 PROBLEM — R97.20 PSA ELEVATION: Status: ACTIVE | Noted: 2022-07-26

## 2022-07-26 PROBLEM — I73.9 PVD (PERIPHERAL VASCULAR DISEASE): Status: ACTIVE | Noted: 2022-07-21

## 2022-07-26 NOTE — HISTORY OF PRESENT ILLNESS
[FreeTextEntry1] : Annual wellness [de-identified] : 67 y/o M presents for an annual wellness, GHM\par Hx of right lower leg recent abnormal quataflo.  also has vertebral artery occlusion.  will refer to vascular for further eval. \par Of note, pt has hx of decreased memory/recall- still not yet followed up with Neuro.  \par Complains of poor appetite-weight loss. \par Pt also has a hx of treated Hep C.\par \par Voices no further complaints.\par ROS as documented below.

## 2022-07-26 NOTE — REVIEW OF SYSTEMS
[Recent Change In Weight] : ~T recent weight change [Memory Loss] : memory loss [Fever] : no fever [Chills] : no chills [Redness] : no redness [Vision Problems] : no vision problems [Earache] : no earache [Nasal Discharge] : no nasal discharge [Sore Throat] : no sore throat [Chest Pain] : no chest pain [Palpitations] : no palpitations [Shortness Of Breath] : no shortness of breath [Wheezing] : no wheezing [Abdominal Pain] : no abdominal pain [Nausea] : no nausea [Diarrhea] : no diarrhea [Vomiting] : no vomiting [Heartburn] : no heartburn [Dysuria] : no dysuria [Hesitancy] : no hesitancy [Hematuria] : no hematuria [Frequency] : no frequency [Joint Pain] : no joint pain [Joint Swelling] : no joint swelling [Skin Rash] : no skin rash [Headache] : no headache [Dizziness] : no dizziness [Fainting] : no fainting [Anxiety] : no anxiety [Depression] : no depression [Swollen Glands] : no swollen glands [FreeTextEntry7] : poor appetite

## 2022-07-27 LAB
25(OH)D3 SERPL-MCNC: 18.8 NG/ML
ALBUMIN SERPL ELPH-MCNC: 5 G/DL
ALP BLD-CCNC: 77 U/L
ALT SERPL-CCNC: 13 U/L
ANION GAP SERPL CALC-SCNC: 18 MMOL/L
APPEARANCE: CLEAR
AST SERPL-CCNC: 24 U/L
BASOPHILS # BLD AUTO: 0.12 K/UL
BASOPHILS NFR BLD AUTO: 2.1 %
BILIRUB SERPL-MCNC: 0.5 MG/DL
BILIRUBIN URINE: NEGATIVE
BLOOD URINE: NEGATIVE
BUN SERPL-MCNC: 22 MG/DL
CALCIUM SERPL-MCNC: 10.2 MG/DL
CHLORIDE SERPL-SCNC: 102 MMOL/L
CHOLEST SERPL-MCNC: 183 MG/DL
CO2 SERPL-SCNC: 22 MMOL/L
COLOR: YELLOW
CREAT SERPL-MCNC: 1.35 MG/DL
EGFR: 58 ML/MIN/1.73M2
EOSINOPHIL # BLD AUTO: 0.2 K/UL
EOSINOPHIL NFR BLD AUTO: 3.4 %
ESTIMATED AVERAGE GLUCOSE: 117 MG/DL
FERRITIN SERPL-MCNC: 598 NG/ML
FOLATE SERPL-MCNC: 14.2 NG/ML
GLUCOSE QUALITATIVE U: NEGATIVE
GLUCOSE SERPL-MCNC: 59 MG/DL
HBA1C MFR BLD HPLC: 5.7 %
HCT VFR BLD CALC: 37.9 %
HDLC SERPL-MCNC: 103 MG/DL
HGB BLD-MCNC: 13.8 G/DL
IMM GRANULOCYTES NFR BLD AUTO: 0.2 %
IRON SATN MFR SERPL: 25 %
IRON SERPL-MCNC: 71 UG/DL
KETONES URINE: NORMAL
LDLC SERPL CALC-MCNC: 65 MG/DL
LEUKOCYTE ESTERASE URINE: NEGATIVE
LYMPHOCYTES # BLD AUTO: 2.08 K/UL
LYMPHOCYTES NFR BLD AUTO: 35.6 %
MAN DIFF?: NORMAL
MCHC RBC-ENTMCNC: 29.6 PG
MCHC RBC-ENTMCNC: 36.4 GM/DL
MCV RBC AUTO: 81.2 FL
MONOCYTES # BLD AUTO: 0.61 K/UL
MONOCYTES NFR BLD AUTO: 10.4 %
NEUTROPHILS # BLD AUTO: 2.83 K/UL
NEUTROPHILS NFR BLD AUTO: 48.3 %
NITRITE URINE: NEGATIVE
NONHDLC SERPL-MCNC: 79 MG/DL
PH URINE: 6
PLATELET # BLD AUTO: 227 K/UL
POTASSIUM SERPL-SCNC: 3.9 MMOL/L
PROT SERPL-MCNC: 7.5 G/DL
PROTEIN URINE: ABNORMAL
PSA FREE FLD-MCNC: 18 %
PSA FREE SERPL-MCNC: 1 NG/ML
PSA SERPL-MCNC: 5.44 NG/ML
RBC # BLD: 4.67 M/UL
RBC # FLD: 15.3 %
SODIUM SERPL-SCNC: 142 MMOL/L
SPECIFIC GRAVITY URINE: 1.02
TIBC SERPL-MCNC: 287 UG/DL
TRIGL SERPL-MCNC: 71 MG/DL
TSH SERPL-ACNC: 1.06 UIU/ML
UIBC SERPL-MCNC: 215 UG/DL
UROBILINOGEN URINE: NORMAL
VIT B12 SERPL-MCNC: 1015 PG/ML
WBC # FLD AUTO: 5.85 K/UL

## 2022-07-27 RX ORDER — MULTIVIT-MIN/FOLIC/VIT K/LYCOP 400-300MCG
50 MCG TABLET ORAL
Qty: 90 | Refills: 3 | Status: ACTIVE | COMMUNITY
Start: 2022-07-27 | End: 1900-01-01

## 2022-10-06 ENCOUNTER — INPATIENT (INPATIENT)
Facility: HOSPITAL | Age: 67
LOS: 0 days | Discharge: HOME | End: 2022-10-07
Attending: INTERNAL MEDICINE | Admitting: INTERNAL MEDICINE

## 2022-10-06 VITALS
WEIGHT: 166.89 LBS | RESPIRATION RATE: 18 BRPM | DIASTOLIC BLOOD PRESSURE: 75 MMHG | HEIGHT: 75 IN | TEMPERATURE: 98 F | SYSTOLIC BLOOD PRESSURE: 121 MMHG | HEART RATE: 78 BPM | OXYGEN SATURATION: 98 %

## 2022-10-06 DIAGNOSIS — G40.909 EPILEPSY, UNSPECIFIED, NOT INTRACTABLE, WITHOUT STATUS EPILEPTICUS: ICD-10-CM

## 2022-10-06 DIAGNOSIS — Z29.9 ENCOUNTER FOR PROPHYLACTIC MEASURES, UNSPECIFIED: ICD-10-CM

## 2022-10-06 DIAGNOSIS — G30.9 ALZHEIMER'S DISEASE, UNSPECIFIED: ICD-10-CM

## 2022-10-06 DIAGNOSIS — I25.10 ATHEROSCLEROTIC HEART DISEASE OF NATIVE CORONARY ARTERY WITHOUT ANGINA PECTORIS: ICD-10-CM

## 2022-10-06 DIAGNOSIS — I10 ESSENTIAL (PRIMARY) HYPERTENSION: ICD-10-CM

## 2022-10-06 DIAGNOSIS — E78.5 HYPERLIPIDEMIA, UNSPECIFIED: ICD-10-CM

## 2022-10-06 LAB
ALBUMIN SERPL ELPH-MCNC: 3.6 G/DL — SIGNIFICANT CHANGE UP (ref 3.3–5)
ALP SERPL-CCNC: 69 U/L — SIGNIFICANT CHANGE UP (ref 40–120)
ALT FLD-CCNC: 15 U/L — SIGNIFICANT CHANGE UP (ref 12–78)
AMMONIA BLD-MCNC: 51 UMOL/L — HIGH (ref 11–32)
ANION GAP SERPL CALC-SCNC: 11 MMOL/L — SIGNIFICANT CHANGE UP (ref 5–17)
AST SERPL-CCNC: 28 U/L — SIGNIFICANT CHANGE UP (ref 15–37)
BASOPHILS # BLD AUTO: 0.06 K/UL — SIGNIFICANT CHANGE UP (ref 0–0.2)
BASOPHILS NFR BLD AUTO: 1.2 % — SIGNIFICANT CHANGE UP (ref 0–2)
BILIRUB SERPL-MCNC: 0.5 MG/DL — SIGNIFICANT CHANGE UP (ref 0.2–1.2)
BUN SERPL-MCNC: 12 MG/DL — SIGNIFICANT CHANGE UP (ref 7–23)
CALCIUM SERPL-MCNC: 9.2 MG/DL — SIGNIFICANT CHANGE UP (ref 8.5–10.1)
CHLORIDE SERPL-SCNC: 99 MMOL/L — SIGNIFICANT CHANGE UP (ref 96–108)
CO2 SERPL-SCNC: 26 MMOL/L — SIGNIFICANT CHANGE UP (ref 22–31)
CREAT SERPL-MCNC: 1.17 MG/DL — SIGNIFICANT CHANGE UP (ref 0.5–1.3)
EGFR: 69 ML/MIN/1.73M2 — SIGNIFICANT CHANGE UP
EOSINOPHIL # BLD AUTO: 0.12 K/UL — SIGNIFICANT CHANGE UP (ref 0–0.5)
EOSINOPHIL NFR BLD AUTO: 2.5 % — SIGNIFICANT CHANGE UP (ref 0–6)
ETHANOL SERPL-MCNC: <10 MG/DL — SIGNIFICANT CHANGE UP (ref 0–10)
GLUCOSE BLDC GLUCOMTR-MCNC: 137 MG/DL — HIGH (ref 70–99)
GLUCOSE SERPL-MCNC: 126 MG/DL — HIGH (ref 70–99)
HCT VFR BLD CALC: 33.2 % — LOW (ref 39–50)
HGB BLD-MCNC: 12.1 G/DL — LOW (ref 13–17)
IMM GRANULOCYTES NFR BLD AUTO: 0.2 % — SIGNIFICANT CHANGE UP (ref 0–0.9)
LACTATE SERPL-SCNC: 2.6 MMOL/L — HIGH (ref 0.7–2)
LYMPHOCYTES # BLD AUTO: 0.98 K/UL — LOW (ref 1–3.3)
LYMPHOCYTES # BLD AUTO: 20.2 % — SIGNIFICANT CHANGE UP (ref 13–44)
MAGNESIUM SERPL-MCNC: 1.8 MG/DL — SIGNIFICANT CHANGE UP (ref 1.6–2.6)
MCHC RBC-ENTMCNC: 29.9 PG — SIGNIFICANT CHANGE UP (ref 27–34)
MCHC RBC-ENTMCNC: 36.4 G/DL — HIGH (ref 32–36)
MCV RBC AUTO: 82 FL — SIGNIFICANT CHANGE UP (ref 80–100)
MONOCYTES # BLD AUTO: 0.59 K/UL — SIGNIFICANT CHANGE UP (ref 0–0.9)
MONOCYTES NFR BLD AUTO: 12.1 % — SIGNIFICANT CHANGE UP (ref 2–14)
NEUTROPHILS # BLD AUTO: 3.1 K/UL — SIGNIFICANT CHANGE UP (ref 1.8–7.4)
NEUTROPHILS NFR BLD AUTO: 63.8 % — SIGNIFICANT CHANGE UP (ref 43–77)
NRBC # BLD: 0 /100 WBCS — SIGNIFICANT CHANGE UP (ref 0–0)
PHOSPHATE SERPL-MCNC: 2.5 MG/DL — SIGNIFICANT CHANGE UP (ref 2.5–4.5)
PLATELET # BLD AUTO: 207 K/UL — SIGNIFICANT CHANGE UP (ref 150–400)
POTASSIUM SERPL-MCNC: 3.7 MMOL/L — SIGNIFICANT CHANGE UP (ref 3.5–5.3)
POTASSIUM SERPL-SCNC: 3.7 MMOL/L — SIGNIFICANT CHANGE UP (ref 3.5–5.3)
PROT SERPL-MCNC: 7.1 GM/DL — SIGNIFICANT CHANGE UP (ref 6–8.3)
RAPID RVP RESULT: SIGNIFICANT CHANGE UP
RBC # BLD: 4.05 M/UL — LOW (ref 4.2–5.8)
RBC # FLD: 14.7 % — HIGH (ref 10.3–14.5)
SARS-COV-2 RNA SPEC QL NAA+PROBE: SIGNIFICANT CHANGE UP
SODIUM SERPL-SCNC: 136 MMOL/L — SIGNIFICANT CHANGE UP (ref 135–145)
TROPONIN I, HIGH SENSITIVITY RESULT: 9.6 NG/L — SIGNIFICANT CHANGE UP
WBC # BLD: 4.86 K/UL — SIGNIFICANT CHANGE UP (ref 3.8–10.5)
WBC # FLD AUTO: 4.86 K/UL — SIGNIFICANT CHANGE UP (ref 3.8–10.5)

## 2022-10-06 PROCEDURE — 99222 1ST HOSP IP/OBS MODERATE 55: CPT

## 2022-10-06 PROCEDURE — 99053 MED SERV 10PM-8AM 24 HR FAC: CPT

## 2022-10-06 PROCEDURE — 95816 EEG AWAKE AND DROWSY: CPT | Mod: 26

## 2022-10-06 PROCEDURE — 99285 EMERGENCY DEPT VISIT HI MDM: CPT

## 2022-10-06 PROCEDURE — 71045 X-RAY EXAM CHEST 1 VIEW: CPT | Mod: 26

## 2022-10-06 PROCEDURE — 70450 CT HEAD/BRAIN W/O DYE: CPT | Mod: 26,MA

## 2022-10-06 PROCEDURE — 93010 ELECTROCARDIOGRAM REPORT: CPT

## 2022-10-06 RX ORDER — HEPARIN SODIUM 5000 [USP'U]/ML
5000 INJECTION INTRAVENOUS; SUBCUTANEOUS EVERY 12 HOURS
Refills: 0 | Status: DISCONTINUED | OUTPATIENT
Start: 2022-10-06 | End: 2022-10-07

## 2022-10-06 RX ORDER — ASPIRIN/CALCIUM CARB/MAGNESIUM 324 MG
81 TABLET ORAL DAILY
Refills: 0 | Status: DISCONTINUED | OUTPATIENT
Start: 2022-10-06 | End: 2022-10-07

## 2022-10-06 RX ORDER — ACETAMINOPHEN 500 MG
650 TABLET ORAL ONCE
Refills: 0 | Status: COMPLETED | OUTPATIENT
Start: 2022-10-06 | End: 2022-10-06

## 2022-10-06 RX ORDER — CLOPIDOGREL BISULFATE 75 MG/1
1 TABLET, FILM COATED ORAL
Qty: 0 | Refills: 0 | DISCHARGE

## 2022-10-06 RX ORDER — LEVETIRACETAM 250 MG/1
1000 TABLET, FILM COATED ORAL ONCE
Refills: 0 | Status: COMPLETED | OUTPATIENT
Start: 2022-10-06 | End: 2022-10-06

## 2022-10-06 RX ORDER — ASPIRIN/CALCIUM CARB/MAGNESIUM 324 MG
1 TABLET ORAL
Qty: 0 | Refills: 0 | DISCHARGE

## 2022-10-06 RX ORDER — QUETIAPINE FUMARATE 200 MG/1
1 TABLET, FILM COATED ORAL
Qty: 0 | Refills: 0 | DISCHARGE

## 2022-10-06 RX ORDER — LEVETIRACETAM 250 MG/1
500 TABLET, FILM COATED ORAL
Refills: 0 | Status: DISCONTINUED | OUTPATIENT
Start: 2022-10-06 | End: 2022-10-07

## 2022-10-06 RX ORDER — INFLUENZA VIRUS VACCINE 15; 15; 15; 15 UG/.5ML; UG/.5ML; UG/.5ML; UG/.5ML
0.7 SUSPENSION INTRAMUSCULAR ONCE
Refills: 0 | Status: DISCONTINUED | OUTPATIENT
Start: 2022-10-06 | End: 2022-10-07

## 2022-10-06 RX ORDER — LOSARTAN POTASSIUM 100 MG/1
50 TABLET, FILM COATED ORAL DAILY
Refills: 0 | Status: DISCONTINUED | OUTPATIENT
Start: 2022-10-06 | End: 2022-10-07

## 2022-10-06 RX ORDER — ATORVASTATIN CALCIUM 80 MG/1
40 TABLET, FILM COATED ORAL AT BEDTIME
Refills: 0 | Status: DISCONTINUED | OUTPATIENT
Start: 2022-10-06 | End: 2022-10-07

## 2022-10-06 RX ORDER — AMLODIPINE BESYLATE 2.5 MG/1
1 TABLET ORAL
Qty: 0 | Refills: 0 | DISCHARGE

## 2022-10-06 RX ORDER — QUETIAPINE FUMARATE 200 MG/1
25 TABLET, FILM COATED ORAL AT BEDTIME
Refills: 0 | Status: DISCONTINUED | OUTPATIENT
Start: 2022-10-06 | End: 2022-10-07

## 2022-10-06 RX ORDER — DONEPEZIL HYDROCHLORIDE 10 MG/1
5 TABLET, FILM COATED ORAL AT BEDTIME
Refills: 0 | Status: DISCONTINUED | OUTPATIENT
Start: 2022-10-06 | End: 2022-10-07

## 2022-10-06 RX ORDER — LOSARTAN POTASSIUM 100 MG/1
1 TABLET, FILM COATED ORAL
Qty: 0 | Refills: 0 | DISCHARGE

## 2022-10-06 RX ORDER — CLOPIDOGREL BISULFATE 75 MG/1
75 TABLET, FILM COATED ORAL DAILY
Refills: 0 | Status: DISCONTINUED | OUTPATIENT
Start: 2022-10-06 | End: 2022-10-07

## 2022-10-06 RX ORDER — THIAMINE MONONITRATE (VIT B1) 100 MG
100 TABLET ORAL DAILY
Refills: 0 | Status: DISCONTINUED | OUTPATIENT
Start: 2022-10-06 | End: 2022-10-07

## 2022-10-06 RX ORDER — ATORVASTATIN CALCIUM 80 MG/1
1 TABLET, FILM COATED ORAL
Qty: 0 | Refills: 0 | DISCHARGE

## 2022-10-06 RX ADMIN — Medication 81 MILLIGRAM(S): at 13:52

## 2022-10-06 RX ADMIN — Medication 650 MILLIGRAM(S): at 05:21

## 2022-10-06 RX ADMIN — CLOPIDOGREL BISULFATE 75 MILLIGRAM(S): 75 TABLET, FILM COATED ORAL at 13:52

## 2022-10-06 RX ADMIN — Medication 100 MILLIGRAM(S): at 13:52

## 2022-10-06 RX ADMIN — LEVETIRACETAM 400 MILLIGRAM(S): 250 TABLET, FILM COATED ORAL at 06:27

## 2022-10-06 RX ADMIN — Medication 650 MILLIGRAM(S): at 04:06

## 2022-10-06 RX ADMIN — DONEPEZIL HYDROCHLORIDE 5 MILLIGRAM(S): 10 TABLET, FILM COATED ORAL at 21:58

## 2022-10-06 RX ADMIN — LOSARTAN POTASSIUM 50 MILLIGRAM(S): 100 TABLET, FILM COATED ORAL at 17:41

## 2022-10-06 RX ADMIN — QUETIAPINE FUMARATE 25 MILLIGRAM(S): 200 TABLET, FILM COATED ORAL at 21:58

## 2022-10-06 RX ADMIN — HEPARIN SODIUM 5000 UNIT(S): 5000 INJECTION INTRAVENOUS; SUBCUTANEOUS at 17:25

## 2022-10-06 RX ADMIN — LEVETIRACETAM 500 MILLIGRAM(S): 250 TABLET, FILM COATED ORAL at 17:25

## 2022-10-06 RX ADMIN — ATORVASTATIN CALCIUM 40 MILLIGRAM(S): 80 TABLET, FILM COATED ORAL at 21:58

## 2022-10-06 NOTE — ED ADULT NURSE NOTE - TEMPLATE LIST FOR HEAD TO TOE ASSESSMENT
HISTORY OF PRESENT ILLNESS:    Yobani Chong is a 43 y.o. female  Patient's last menstrual period was 2020. presents today complaining of menorrhagia.   Patient scheduled for HTA, the patient has more questions today.  Risks and benefits of procedure discussed with patient in detail.  Medical therapy and surgical options discussed in detail. Patient desires to proceed with minimally invasive surgery with D&C/HTA. She understands that ablation is performed on the endometrial lining of the uterus and does not directly affect uterine musculature. She does not desire future children and she is aware that this procedure will not provide contraception. She understands that her cycles could be lighter, normal, the same or she could have amenorrhea.    Past Medical History:   Diagnosis Date    Abnormal cervical Papanicolaou smear 2008    Colposcopy    Obesity        Past Surgical History:   Procedure Laterality Date     SECTION      x2    CHOLECYSTECTOMY         MEDICATIONS AND ALLERGIES:      Current Outpatient Medications:     ascorbic acid, vitamin C, (VITAMIN C) 100 MG tablet, Take 100 mg by mouth once daily., Disp: , Rfl:     fish oil-omega-3 fatty acids 300-1,000 mg capsule, Take by mouth once daily., Disp: , Rfl:     iron,carb/vit C/vit B12/folic (IRON 100 PLUS ORAL), , Disp: , Rfl:     Lactobacillus rhamnosus GG (CULTURELLE) 10 billion cell capsule, Take 1 capsule by mouth once daily., Disp: , Rfl:     multivitamin (ONE DAILY MULTIVITAMIN) per tablet, Take 1 tablet by mouth once daily., Disp: , Rfl:     omeprazole (PRILOSEC) 20 MG capsule, Take 1 capsule (20 mg total) by mouth once daily., Disp: 14 capsule, Rfl: 0    vitamin D (VITAMIN D3) 1000 units Tab, Take 2,000 Units by mouth once daily., Disp: , Rfl:     Review of patient's allergies indicates:  No Known Allergies    COMPREHENSIVE GYN HISTORY:  PAP History: Denies abnormal Paps.  Infection History: Denies STDs. Denies  PID.  Benign History: Denies uterine fibroids. Denies ovarian cysts. Denies endometriosis. Denies other conditions.  Cancer History: Denies cervical cancer. Denies uterine cancer or hyperplasia. Denies ovarian cancer. Denies vulvar cancer or pre-cancer. Denies vaginal cancer or pre-cancer. Denies breast cancer. Denies colon cancer.  Sexual Activity History: Reports currently being sexually active  Menstrual History: Every 28 days, flows for 4 days. Light flow.  Dysmenorrhea History: Denies dysmenorrhea.  Contraception History:      ROS:  GENERAL: No fever or chills.  BREASTS: No pain. No lumps. No discharge.  ABDOMEN: No pain. No nausea. No vomiting. No diarrhea. No constipation.  REPRODUCTIVE: No abnormal bleeding.   VULVA: No pain. No lesions. No itching.  VAGINA: No relaxation. No itching. No odor. No discharge. No lesions.  URINARY: No incontinence. No nocturia. No frequency. No dysuria.    PE:  APPEARANCE: Well nourished, well developed, in no acute distress.  AFFECT: WNL, alert and oriented x 3.  Deferred    1. Menorrhagia with regular cycle    2. Iron deficiency anemia due to chronic blood loss        FOLLOW-UP with me pending test results.       Neuro

## 2022-10-06 NOTE — H&P ADULT - HISTORY OF PRESENT ILLNESS
Patient is a 66M with a PMH of CAD, HTN, dementia, HLD who presents to the ED s/p seizure.  Patient currently awake but is nonverbal, unable to provide history.  Per ED attending, patient was brought in after he had a seizure in bed witnessed by his wife.  Reportedly patient was unresponsive, his eyes roll back and he had generalized shaking causing him to fall out of the bed.  Was brought to the ED for evaluation where he had another witnessed seizure about two minutes in length.  Patient reportedly has no seizure history.  Vitals stable, labs show lactic acidosis.  Will admit to med surg.

## 2022-10-06 NOTE — H&P ADULT - NSHPPHYSICALEXAM_GEN_ALL_CORE
Physical exam:  General: patient in no acute distress, resting comfortably  Head:  Atraumatic, Normocephalic  Eyes: EOMI, PERRLA, clear sclera  Neck: Supple, thyroid nontender, non enlarged  Cardio: S1/S2 +ve, regular rate and rhythm, no M/G/R  Resp: clear to ausculation bilaterally, no rales or wheezes  GI: abdomen soft, nontender, non distended, no guarding, BS +ve x 4  Ext: no significant pedal edema  Neuro: patient currently nonverbal, responds nonverbally to voice or light touch, gross movement of all extremities   Skin: No rashes or lesions

## 2022-10-06 NOTE — H&P ADULT - PROBLEM SELECTOR PLAN 1
New onset seizures.  Loaded with keppra 1000 in the ED.  Will continue 500 PO BID for now  CT head negative.   Will consult neuro in the am.    Patient will likely need EEG,

## 2022-10-06 NOTE — PATIENT PROFILE ADULT - FALL HARM RISK - HARM RISK INTERVENTIONS

## 2022-10-06 NOTE — ED PROVIDER NOTE - CLINICAL SUMMARY MEDICAL DECISION MAKING FREE TEXT BOX
65 yo M with new onset seizure, doubt stroke, doubt bleed, acs unlikely  -basic labs, etoh, drug screen, lactate, mag, rvp, phos, rvp, trop, CXR, CT brain, tylenol prn, sz precautions, monitor  -f/u results, reeval

## 2022-10-06 NOTE — ED ADULT TRIAGE NOTE - CHIEF COMPLAINT QUOTE
Patient BIB [facility]. Per patient's wife, patient was lying in bed when he started having a seizure. Patient fell off the bed.  A&Ox3 in triage. Denies pain. Pmh Htn, hld, carotid artery blockage(on aspirin).

## 2022-10-06 NOTE — EEG REPORT - NS EEG TEXT BOX
DOMENICA SAPP [id number]-[id number] 66y (1955)M  Admitting MD: Dr. Manuel Cummings    Study Date: 10-06-22    --------------------------------------------------------------------------------------------------  History:  CC/ HPI Patient is a 66y old  Male who presents with a chief complaint of seizures (06 Oct 2022 10:36)    aspirin enteric coated 81 milliGRAM(s) Oral daily  atorvastatin 40 milliGRAM(s) Oral at bedtime  clopidogrel Tablet 75 milliGRAM(s) Oral daily  donepezil 5 milliGRAM(s) Oral at bedtime  heparin   Injectable 5000 Unit(s) SubCutaneous every 12 hours  influenza  Vaccine (HIGH DOSE) 0.7 milliLiter(s) IntraMuscular once  levETIRAcetam 500 milliGRAM(s) Oral two times a day  losartan 50 milliGRAM(s) Oral daily  QUEtiapine 25 milliGRAM(s) Oral at bedtime  thiamine 100 milliGRAM(s) Oral daily    --------------------------------------------------------------------------------------------------  Study Interpretation:    [[[Abbreviation Key:  PDR=alpha rhythm/posterior dominant rhythm. A-P=anterior posterior gradient.  Amplitude: ‘very low’:<20; ‘low’:20-50; ‘medium’:; ‘high’:>200uV.  Persistence for periodic/rhythmic patterns (% of epoch) ‘rare’:<1%; ‘occasional’:1-10%; ‘frequent’:10-50%; ‘abundant’:50-90%; ‘continuous’:>90%.  Persistence for sporadic discharges: ‘rare’:<1/hr; ‘occasional’:1/min-1/hr; ‘frequent’:>1/min; ‘abundant’:>1/10 sec.  GRDA=generalized rhythmic delta activity; FIRDA=frontal intermittent GRDA; LRDA=lateralized rhythmic delta activity; TIRDA=temporal intermittent rhythmic delta activity;  LPD=PLED=lateralized periodic discharges; GPD=generalized periodic discharges; BiPDs=BiPLEDs=bilateral independent periodic epileptiform discharges; SIRPID=stimulus induced rhythmic, periodic, or ictal appearing discharges; BIRDs=brief potentially ictal rhythmic discharges >4 Hz, lasting .5-10s; PFA (paroxysmal bursts >13 Hz or =8 Hz).  Modifiers: +F=with fast component; +S=with spike component; +R=with rhythmic component.  S-B=burst suppression pattern.  Max=maximal. N1-drowsy; N2-stage II sleep; N3-slow wave sleep. SSS/BETS=small sharp spikes/benign epileptiform transients of sleep. HV=hyperventilation; PS=photic stimulation]]]    FINDINGS:  The background was continuous, spontaneously variable and reactive.  During wakefulness, the posteriorly dominant rhythm consisted of symmetric, well modulated 8.5 Hz activity, with an amplitude to 40 uV, that attenuated to eye opening.  Low amplitude central beta was noted in wakefulness.    Background Slowing:  Generalized slowing: none was present.  Focal slowing: none was present.    Sleep Background:  -Drowsiness was characterized by fragmentation, attenuation, and slowing of the background activity.    -N2 sleep transients were not recorded.    Epileptiform Activity:   No interictal epileptiform discharges were present.    Events:  No clinical events were recorded.  No seizures were recorded.    Activation Procedures:   -Hyperventilation was not performed.    -Photic stimulation was performed and did not elicit any abnormalities.      Artifacts:  Intermittent myogenic and external motion artifacts were noted.    ECG:  The heart rate on single channel ECG at baseline was predominantly near BPM = 60-70  -----------------------------------------------------------------------------------------------------    EEG Classification / Summary:  normal EEG study, awake / drowsy   -----------------------------------------------------------------------------------------------------    Clinical Impression:  Normal awake REEG  There were no epileptiform abnormalities recorded.      This is a prelim report only, pending review with attending prior to finalization.  -------------------------------------------------------------------------------------------------------  [facility] EEG Reading Room Ph#: [phone]  Epilepsy Answering Service after 5PM and before 8:30AM: Ph#: [phone]    Luis Baird M.D, epilepsy fellow   DOMENICA SAPP [id number] 66y (1955)M  Admitting MD: Dr. Manuel Cummings    Study Date: 10-06-22    --------------------------------------------------------------------------------------------------  History:  CC/ HPI Patient is a 66y old  Male who presents with a chief complaint of seizures (06 Oct 2022 10:36)    aspirin enteric coated 81 milliGRAM(s) Oral daily  atorvastatin 40 milliGRAM(s) Oral at bedtime  clopidogrel Tablet 75 milliGRAM(s) Oral daily  donepezil 5 milliGRAM(s) Oral at bedtime  heparin   Injectable 5000 Unit(s) SubCutaneous every 12 hours  influenza  Vaccine (HIGH DOSE) 0.7 milliLiter(s) IntraMuscular once  levETIRAcetam 500 milliGRAM(s) Oral two times a day  losartan 50 milliGRAM(s) Oral daily  QUEtiapine 25 milliGRAM(s) Oral at bedtime  thiamine 100 milliGRAM(s) Oral daily    --------------------------------------------------------------------------------------------------  Study Interpretation:    [[[Abbreviation Key:  PDR=alpha rhythm/posterior dominant rhythm. A-P=anterior posterior gradient.  Amplitude: ‘very low’:<20; ‘low’:20-50; ‘medium’:; ‘high’:>200uV.  Persistence for periodic/rhythmic patterns (% of epoch) ‘rare’:<1%; ‘occasional’:1-10%; ‘frequent’:10-50%; ‘abundant’:50-90%; ‘continuous’:>90%.  Persistence for sporadic discharges: ‘rare’:<1/hr; ‘occasional’:1/min-1/hr; ‘frequent’:>1/min; ‘abundant’:>1/10 sec.  GRDA=generalized rhythmic delta activity; FIRDA=frontal intermittent GRDA; LRDA=lateralized rhythmic delta activity; TIRDA=temporal intermittent rhythmic delta activity;  LPD=PLED=lateralized periodic discharges; GPD=generalized periodic discharges; BiPDs=BiPLEDs=bilateral independent periodic epileptiform discharges; SIRPID=stimulus induced rhythmic, periodic, or ictal appearing discharges; BIRDs=brief potentially ictal rhythmic discharges >4 Hz, lasting .5-10s; PFA (paroxysmal bursts >13 Hz or =8 Hz).  Modifiers: +F=with fast component; +S=with spike component; +R=with rhythmic component.  S-B=burst suppression pattern.  Max=maximal. N1-drowsy; N2-stage II sleep; N3-slow wave sleep. SSS/BETS=small sharp spikes/benign epileptiform transients of sleep. HV=hyperventilation; PS=photic stimulation]]]    FINDINGS:  The background was continuous, spontaneously variable and reactive.  During wakefulness, the posteriorly dominant rhythm consisted of symmetric, well modulated 8.5 Hz activity, with an amplitude to 40 uV, that attenuated to eye opening.  Low amplitude central beta was noted in wakefulness.    Background Slowing:  Generalized slowing: none was present.  Focal slowing: none was present.    Sleep Background:  -Drowsiness was characterized by fragmentation, attenuation, and slowing of the background activity.    -N2 sleep transients were not recorded.    Epileptiform Activity:   No interictal epileptiform discharges were present.    Events:  No clinical events were recorded.  No seizures were recorded.    Activation Procedures:   -Hyperventilation was not performed.    -Photic stimulation was performed and did not elicit any abnormalities.      Artifacts:  Intermittent myogenic and external motion artifacts were noted.    ECG:  The heart rate on single channel ECG at baseline was predominantly near BPM = 60-70  -----------------------------------------------------------------------------------------------------    EEG Classification / Summary:  normal EEG study, awake / drowsy   -----------------------------------------------------------------------------------------------------    Clinical Impression:  Normal awake REEG  There were no epileptiform abnormalities recorded.    -------------------------------------------------------------------------------------------------------  [facility] EEG Reading Room Ph#: [phone]  Epilepsy Answering Service after 5PM and before 8:30AM: Ph#: [phone]    Luis Baird M.D, epilepsy fellow    Johan Ryder MD PhD  Director, Epilepsy Division, [facility]

## 2022-10-06 NOTE — ED PROVIDER NOTE - PROGRESS NOTE DETAILS
Results reported to patient--grossly benign, CT normal, labs consistent with seizure (ammonia/lactate)  Pt. reports feeling better after ER stay, no complaints, no further sz activity in ER  pt. agrees to f/u with primary care outpt., neuro referral given urgently for f/u  pt. understands to return to ED if symptoms worsen; will d/c without change to meds at this time pt. just had another seizure in ER while waiting for wife, will hold d/c and plan for admit for emergent neuro eval and monitoring
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
## 2022-10-06 NOTE — ED ADULT NURSE NOTE - OBJECTIVE STATEMENT
Patient aaox4. Patient s/p seizure. started at 2 macrina as per wife at bedside. Seizure lasted 2 mins. Pt complaint of pain at the back of the head aat4/10. Unsure if he hit his head. Denies d/n/v or change of vision

## 2022-10-06 NOTE — H&P ADULT - ASSESSMENT
Patient is a 66M with a PMH of CAD, HTN, dementia, HLD who presents to the ED s/p seizure.  Patient currently awake but is nonverbal, unable to provide history.  Per ED attending, patient was brought in after he had a seizure in bed witnessed by his wife.  Reportedly patient was unresponsive, his eyes roll back and he had generalized shaking causing him to fall out of the bed.  Was brought to the ED for evaluation where he had another witnessed seizure about two minutes in length.  Patient reportedly has no seizure history.  Vitals stable, labs show lactic acidosis.  Will admit to med surg.     IMPROVE VTE Individual Risk Assessment          RISK                                                          Points  [  ] Previous VTE                                                3  [  ] Thrombophilia                                             2  [  ] Lower limb paralysis                                    2        (unable to hold up >15 seconds)    [  ] Current Cancer                                             2         (within 6 months)  [  ] Immobilization > 24 hrs                              1  [  ] ICU/CCU stay > 24 hours                            1  [  ] Age > 60                                                    1    IMPROVE VTE Score - 1

## 2022-10-06 NOTE — ED PROVIDER NOTE - PATIENT PORTAL LINK FT
You can access the FollowMyHealth Patient Portal offered by [facility] by registering at the following website: [email or web address]. By joining [facility]’s FollowMyHealth portal, you will also be able to view your health information using other applications (apps) compatible with our system.

## 2022-10-06 NOTE — ED PROVIDER NOTE - CARE PROVIDER_API CALL
Edmar Garcia)  Clinical Neurophysiology; Neurology  [address], [address]  [place] [address]  Phone: [phone]  Fax: [phone]  Follow Up Time: Urgent

## 2022-10-06 NOTE — ED PROVIDER NOTE - OBJECTIVE STATEMENT
65 yo M with new onset seizure.  Pt.'s wife saw him shaking in bed, total body, unresponsive during event, estimated to be lasting a minute or less.  Pt. was foaming at the mouth, eyes rolling back.  Pt. was noted to be post-ictal after for minutes, now back to baseline.  Wife called EMS, as she was frightened--never saw pt. seizing before.  No other complaints/associated symptoms/recent trauma.  Pt. feels well now, doesn't remember event, has no complaints, currently.  ROS: negative for fever, cough, headache, chest pain, shortness of breath, abd pain, nausea, vomiting, diarrhea, rash, paresthesia, and weakness--all other systems reviewed are negative.   PMH: HTN, HLD, carotid A stenosis; Meds: See EMR for list; SH: Denies smoking/drinking/drug use
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
## 2022-10-06 NOTE — ED ADULT NURSE NOTE - CHIEF COMPLAINT QUOTE
Patient BIB [facility]. Per patient's wife, patient was lying in bed when he started having a seizure. Patient fell off the bed.  A&Ox3 in triage. Denies pain. Pmh Htn, hld, carotid artery blockage(on aspirin).

## 2022-10-06 NOTE — ED PROVIDER NOTE - PHYSICAL EXAMINATION
Vitals: WNL  Gen: AAOx3, NAD, sitting comfortably in stretcher, calm, non-toxic   Head: ncat, perrla, eomi b/l  Neck: supple, no lymphadenopathy, no midline deviation  Heart: rrr, no m/r/g  Lungs: CTA b/l, no rales/ronchi/wheezes  Abd: soft, nontender, non-distended, no rebound or guarding  Ext: no clubbing/cyanosis/edema  Neuro: sensation and muscle strength intact b/l, steady gait, CN2-12 intact b/l, no focal weakness or sensory loss

## 2022-10-06 NOTE — H&P ADULT - NSHPLABSRESULTS_GEN_ALL_CORE
Recent Vitals  T(C): 36.7 (10-06-22 @ 06:03), Max: 36.7 (10-06-22 @ 04:24)  HR: 111 (10-06-22 @ 06:03) (59 - 111)  BP: 128/59 (10-06-22 @ 06:03) (121/75 - 128/59)  RR: 25 (10-06-22 @ 06:03) (15 - 25)  SpO2: 99% (10-06-22 @ 06:03) (98% - 99%)                        12.1   4.86  )-----------( 207      ( 06 Oct 2022 03:50 )             33.2     10-06    136  |  99  |  12  ----------------------------<  126<H>  3.7   |  26  |  1.17    Ca    9.2      06 Oct 2022 03:50  Phos  2.5     10-06  Mg     1.8     10-06    TPro  7.1  /  Alb  3.6  /  TBili  0.5  /  DBili  x   /  AST  28  /  ALT  15  /  AlkPhos  69  10-06      LIVER FUNCTIONS - ( 06 Oct 2022 03:50 )  Alb: 3.6 g/dL / Pro: 7.1 gm/dL / ALK PHOS: 69 U/L / ALT: 15 U/L / AST: 28 U/L / GGT: x               Home Medications:  amLODIPine 10 mg oral tablet: 1 tab(s) orally once a day (06 Oct 2022 04:01)  aspirin 81 mg oral tablet: 1 tab(s) orally once a day (06 Oct 2022 04:01)  atorvastatin 40 mg oral tablet: 1 tab(s) orally once a day (06 Oct 2022 04:01)  clopidogrel 75 mg oral tablet: 1 tab(s) orally once a day (06 Oct 2022 04:01)  donepezil:  (06 Oct 2022 04:01)  losartan 50 mg oral tablet: 1 tab(s) orally once a day (06 Oct 2022 04:01)  QUEtiapine 25 mg oral tablet: 1 tab(s) orally once a day (at bedtime) (06 Oct 2022 04:01)

## 2022-10-07 ENCOUNTER — TRANSCRIPTION ENCOUNTER (OUTPATIENT)
Age: 67
End: 2022-10-07

## 2022-10-07 VITALS — HEART RATE: 69 BPM | DIASTOLIC BLOOD PRESSURE: 75 MMHG | SYSTOLIC BLOOD PRESSURE: 121 MMHG

## 2022-10-07 LAB
ALBUMIN SERPL ELPH-MCNC: 3.4 G/DL — SIGNIFICANT CHANGE UP (ref 3.3–5)
ALP SERPL-CCNC: 71 U/L — SIGNIFICANT CHANGE UP (ref 40–120)
ALT FLD-CCNC: 16 U/L — SIGNIFICANT CHANGE UP (ref 12–78)
AMPHET UR-MCNC: NEGATIVE — SIGNIFICANT CHANGE UP
ANION GAP SERPL CALC-SCNC: 8 MMOL/L — SIGNIFICANT CHANGE UP (ref 5–17)
APPEARANCE UR: CLEAR — SIGNIFICANT CHANGE UP
AST SERPL-CCNC: 26 U/L — SIGNIFICANT CHANGE UP (ref 15–37)
BACTERIA # UR AUTO: ABNORMAL
BARBITURATES UR SCN-MCNC: NEGATIVE — SIGNIFICANT CHANGE UP
BENZODIAZ UR-MCNC: NEGATIVE — SIGNIFICANT CHANGE UP
BILIRUB SERPL-MCNC: 0.7 MG/DL — SIGNIFICANT CHANGE UP (ref 0.2–1.2)
BILIRUB UR-MCNC: ABNORMAL
BUN SERPL-MCNC: 9 MG/DL — SIGNIFICANT CHANGE UP (ref 7–23)
CALCIUM SERPL-MCNC: 9.3 MG/DL — SIGNIFICANT CHANGE UP (ref 8.5–10.1)
CHLORIDE SERPL-SCNC: 104 MMOL/L — SIGNIFICANT CHANGE UP (ref 96–108)
CO2 SERPL-SCNC: 27 MMOL/L — SIGNIFICANT CHANGE UP (ref 22–31)
COCAINE METAB.OTHER UR-MCNC: NEGATIVE — SIGNIFICANT CHANGE UP
COD CRY URNS QL: ABNORMAL
COLOR SPEC: YELLOW — SIGNIFICANT CHANGE UP
COMMENT - URINE: SIGNIFICANT CHANGE UP
CREAT SERPL-MCNC: 0.98 MG/DL — SIGNIFICANT CHANGE UP (ref 0.5–1.3)
DIFF PNL FLD: NEGATIVE — SIGNIFICANT CHANGE UP
EGFR: 85 ML/MIN/1.73M2 — SIGNIFICANT CHANGE UP
EPI CELLS # UR: SIGNIFICANT CHANGE UP
GLUCOSE BLDC GLUCOMTR-MCNC: 92 MG/DL — SIGNIFICANT CHANGE UP (ref 70–99)
GLUCOSE BLDC GLUCOMTR-MCNC: 96 MG/DL — SIGNIFICANT CHANGE UP (ref 70–99)
GLUCOSE SERPL-MCNC: 77 MG/DL — SIGNIFICANT CHANGE UP (ref 70–99)
GLUCOSE UR QL: NEGATIVE MG/DL — SIGNIFICANT CHANGE UP
HCT VFR BLD CALC: 32.7 % — LOW (ref 39–50)
HCV AB S/CO SERPL IA: 9.8 S/CO — HIGH (ref 0–0.99)
HCV AB SERPL-IMP: REACTIVE
HCV RNA FLD QL NAA+PROBE: SIGNIFICANT CHANGE UP
HCV RNA SPEC QL PROBE+SIG AMP: SIGNIFICANT CHANGE UP
HGB BLD-MCNC: 11.9 G/DL — LOW (ref 13–17)
HYALINE CASTS # UR AUTO: ABNORMAL /LPF
KETONES UR-MCNC: ABNORMAL
LEUKOCYTE ESTERASE UR-ACNC: NEGATIVE — SIGNIFICANT CHANGE UP
MAGNESIUM SERPL-MCNC: 1.8 MG/DL — SIGNIFICANT CHANGE UP (ref 1.6–2.6)
MCHC RBC-ENTMCNC: 29.5 PG — SIGNIFICANT CHANGE UP (ref 27–34)
MCHC RBC-ENTMCNC: 36.4 G/DL — HIGH (ref 32–36)
MCV RBC AUTO: 80.9 FL — SIGNIFICANT CHANGE UP (ref 80–100)
METHADONE UR-MCNC: NEGATIVE — SIGNIFICANT CHANGE UP
NITRITE UR-MCNC: NEGATIVE — SIGNIFICANT CHANGE UP
NRBC # BLD: 0 /100 WBCS — SIGNIFICANT CHANGE UP (ref 0–0)
OPIATES UR-MCNC: NEGATIVE — SIGNIFICANT CHANGE UP
PCP SPEC-MCNC: SIGNIFICANT CHANGE UP
PCP UR-MCNC: NEGATIVE — SIGNIFICANT CHANGE UP
PH UR: 6 — SIGNIFICANT CHANGE UP (ref 5–8)
PHOSPHATE SERPL-MCNC: 3.1 MG/DL — SIGNIFICANT CHANGE UP (ref 2.5–4.5)
PLATELET # BLD AUTO: 205 K/UL — SIGNIFICANT CHANGE UP (ref 150–400)
POTASSIUM SERPL-MCNC: 3.4 MMOL/L — LOW (ref 3.5–5.3)
POTASSIUM SERPL-SCNC: 3.4 MMOL/L — LOW (ref 3.5–5.3)
PROT SERPL-MCNC: 6.4 GM/DL — SIGNIFICANT CHANGE UP (ref 6–8.3)
PROT UR-MCNC: 15 MG/DL
RBC # BLD: 4.04 M/UL — LOW (ref 4.2–5.8)
RBC # FLD: 14.9 % — HIGH (ref 10.3–14.5)
RBC CASTS # UR COMP ASSIST: SIGNIFICANT CHANGE UP /HPF (ref 0–4)
SODIUM SERPL-SCNC: 139 MMOL/L — SIGNIFICANT CHANGE UP (ref 135–145)
SP GR SPEC: 1.02 — SIGNIFICANT CHANGE UP (ref 1.01–1.02)
THC UR QL: POSITIVE — SIGNIFICANT CHANGE UP
UROBILINOGEN FLD QL: 8 MG/DL
WBC # BLD: 5.83 K/UL — SIGNIFICANT CHANGE UP (ref 3.8–10.5)
WBC # FLD AUTO: 5.83 K/UL — SIGNIFICANT CHANGE UP (ref 3.8–10.5)
WBC UR QL: SIGNIFICANT CHANGE UP

## 2022-10-07 PROCEDURE — 99238 HOSP IP/OBS DSCHRG MGMT 30/<: CPT

## 2022-10-07 RX ORDER — DONEPEZIL HYDROCHLORIDE 10 MG/1
5 TABLET, FILM COATED ORAL
Qty: 0 | Refills: 0 | DISCHARGE

## 2022-10-07 RX ORDER — DONEPEZIL HYDROCHLORIDE 10 MG/1
0 TABLET, FILM COATED ORAL
Qty: 0 | Refills: 0 | DISCHARGE

## 2022-10-07 RX ORDER — LEVETIRACETAM 250 MG/1
1 TABLET, FILM COATED ORAL
Qty: 60 | Refills: 0
Start: 2022-10-07 | End: 2022-11-05

## 2022-10-07 RX ORDER — THIAMINE MONONITRATE (VIT B1) 100 MG
1 TABLET ORAL
Qty: 30 | Refills: 0
Start: 2022-10-07 | End: 2022-11-05

## 2022-10-07 RX ADMIN — LEVETIRACETAM 500 MILLIGRAM(S): 250 TABLET, FILM COATED ORAL at 06:03

## 2022-10-07 RX ADMIN — HEPARIN SODIUM 5000 UNIT(S): 5000 INJECTION INTRAVENOUS; SUBCUTANEOUS at 06:03

## 2022-10-07 RX ADMIN — Medication 100 MILLIGRAM(S): at 11:17

## 2022-10-07 RX ADMIN — CLOPIDOGREL BISULFATE 75 MILLIGRAM(S): 75 TABLET, FILM COATED ORAL at 11:17

## 2022-10-07 RX ADMIN — LOSARTAN POTASSIUM 50 MILLIGRAM(S): 100 TABLET, FILM COATED ORAL at 08:13

## 2022-10-07 RX ADMIN — Medication 81 MILLIGRAM(S): at 11:17

## 2022-10-07 NOTE — DISCHARGE NOTE NURSING/CASE MANAGEMENT/SOCIAL WORK - PATIENT PORTAL LINK FT
You can access the FollowMyHealth Patient Portal offered by [facility] by registering at the following website: [email or web address]. By joining [facility]’s FollowMyHealth portal, you will also be able to view your health information using other applications (apps) compatible with our system.
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
## 2022-10-07 NOTE — DISCHARGE NOTE PROVIDER - DISCHARGE SERVICE FOR PATIENT
on the discharge service for the patient. I have reviewed and made amendments to the documentation where necessary.
None

## 2022-10-07 NOTE — DISCHARGE NOTE PROVIDER - NSDCCPCAREPLAN_GEN_ALL_CORE_FT
PRINCIPAL DISCHARGE DIAGNOSIS  Diagnosis: New onset seizure  Assessment and Plan of Treatment:       SECONDARY DISCHARGE DIAGNOSES  Diagnosis: Benign essential HTN  Assessment and Plan of Treatment:     Diagnosis: Hyperlipidemia, unspecified  Assessment and Plan of Treatment:     Diagnosis: Vertebral artery occlusion, unspecified laterality  Assessment and Plan of Treatment:

## 2022-10-07 NOTE — DISCHARGE NOTE NURSING/CASE MANAGEMENT/SOCIAL WORK - NSDCPEFALRISK_GEN_ALL_CORE
For information on Fall & Injury Prevention, visit: [email or web address] OR  [email or web address] OR  https://www.cdc.gov/steadi/patient.html

## 2022-10-07 NOTE — DISCHARGE NOTE PROVIDER - NSDCACTIVITY_GEN_ALL_CORE
No driving/ mechinery operation/swimming/ working at heights/ climbing tall structures unless clear by neurology outpatient. Take showers only. Do not take baths because of the risk of drowning during a seizure/No restrictions/Do not drive or operate machinery

## 2022-10-07 NOTE — DISCHARGE NOTE PROVIDER - NSDCMRMEDTOKEN_GEN_ALL_CORE_FT
amLODIPine 10 mg oral tablet: 1 tab(s) orally once a day  aspirin 81 mg oral tablet: 1 tab(s) orally once a day  atorvastatin 40 mg oral tablet: 1 tab(s) orally once a day  clopidogrel 75 mg oral tablet: 1 tab(s) orally once a day  donepezil: 5 milligram(s) orally once a day  levETIRAcetam 500 mg oral tablet: 1 tab(s) orally 2 times a day  losartan 50 mg oral tablet: 1 tab(s) orally once a day  QUEtiapine 25 mg oral tablet: 1 tab(s) orally once a day (at bedtime)  thiamine 100 mg oral tablet: 1 tab(s) orally once a day

## 2022-10-07 NOTE — DISCHARGE NOTE PROVIDER - ATTENDING DISCHARGE PHYSICAL EXAMINATION:
CONSTITUTIONAL: Well developed, well nourished, alert and cooperative, no acute distress  EYES: PERRL,  no scleral icterus  ENT: Mucosa moist, tongue normal.  NECK: Neck supple, trachea midline, non-tender  CARDIAC: Normal S1 and S2. Regular rate and rhythms. No murmurs. No Pedal edema. Peripheral pulses intact  LUNGS: Clear to auscultation, equal air entry both lungs. No rales, rhonchi, wheezing. Normal respiratory effort.   ABDOMEN: Soft, nondistended, nontender. No guarding or rebound tenderness. No hepatomegaly or splenomegaly. Bowel sound normal.  MUSCULOSKELETAL: Normocephalic, atraumatic.  No significant deformity or joint abnormality. No varicosities.  NEUROLOGICAL: No gross motor or sensory deficits.  PSYCHIATRIC: A&O x 3, appropriate mood and affect.

## 2022-10-08 ENCOUNTER — NON-APPOINTMENT (OUTPATIENT)
Age: 67
End: 2022-10-08

## 2022-10-11 DIAGNOSIS — R56.9 UNSPECIFIED CONVULSIONS: ICD-10-CM

## 2022-10-11 DIAGNOSIS — I65.09 OCCLUSION AND STENOSIS OF UNSPECIFIED VERTEBRAL ARTERY: ICD-10-CM

## 2022-10-11 DIAGNOSIS — E78.5 HYPERLIPIDEMIA, UNSPECIFIED: ICD-10-CM

## 2022-10-11 DIAGNOSIS — I10 ESSENTIAL (PRIMARY) HYPERTENSION: ICD-10-CM

## 2022-10-11 DIAGNOSIS — F02.80 DEMENTIA IN OTHER DISEASES CLASSIFIED ELSEWHERE, UNSPECIFIED SEVERITY, WITHOUT BEHAVIORAL DISTURBANCE, PSYCHOTIC DISTURBANCE, MOOD DISTURBANCE, AND ANXIETY: ICD-10-CM

## 2022-10-11 DIAGNOSIS — E87.20 ACIDOSIS, UNSPECIFIED: ICD-10-CM

## 2022-10-11 DIAGNOSIS — Z18.89 OTHER SPECIFIED RETAINED FOREIGN BODY FRAGMENTS: ICD-10-CM

## 2022-10-11 DIAGNOSIS — G30.9 ALZHEIMER'S DISEASE, UNSPECIFIED: ICD-10-CM

## 2022-10-13 PROBLEM — E78.00 PURE HYPERCHOLESTEROLEMIA, UNSPECIFIED: Chronic | Status: ACTIVE | Noted: 2022-10-06

## 2022-10-13 PROBLEM — I10 ESSENTIAL (PRIMARY) HYPERTENSION: Chronic | Status: ACTIVE | Noted: 2022-10-06

## 2022-11-09 ENCOUNTER — APPOINTMENT (OUTPATIENT)
Dept: NEUROLOGY | Facility: CLINIC | Age: 67
End: 2022-11-09

## 2022-11-09 VITALS
WEIGHT: 170 LBS | HEIGHT: 75 IN | SYSTOLIC BLOOD PRESSURE: 125 MMHG | BODY MASS INDEX: 21.14 KG/M2 | DIASTOLIC BLOOD PRESSURE: 77 MMHG | HEART RATE: 62 BPM

## 2022-11-09 DIAGNOSIS — R56.9 UNSPECIFIED CONVULSIONS: ICD-10-CM

## 2022-11-09 DIAGNOSIS — Z13.31 ENCOUNTER FOR SCREENING FOR DEPRESSION: ICD-10-CM

## 2022-11-09 PROCEDURE — 99214 OFFICE O/P EST MOD 30 MIN: CPT

## 2022-11-09 PROCEDURE — ZZZZZ: CPT
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
## 2022-11-09 NOTE — PHYSICAL EXAM
[FreeTextEntry1] : General\par Constitutional: alert and in no acute distress. \par Psychiatric: affect normal, insight and judgment intact.\par Neurologic: \par Orientation: oriented to person, place, and time \par Attention: normal concentrating ability and attention was not decreased. \par Language: no difficulty naming common objects, repeating a phrase, writing a sentence; fluency, comprehension, and reading intact. \par Fund of knowledge: displays adequate knowledge of personal past history. \par Cranial Nerves: visual acuity intact bilaterally, visual fields full to confrontation, pupils equal round and reactive to light, extraocular motion intact, facial sensation intact symmetrically, face symmetrical, hearing was intact bilaterally, tongue and palate midline, head turning and shoulder shrug symmetric and there was no tongue deviation with protrusion. \par Motor: RH. muscle tone was normal in all four extremities, muscle strength was normal in all four extremities. decreased bulk RUE\par R toes numb.\par Coordination:. normal gait. balance was intact. there was no past-pointing. no tremor present. \par Deep tendon reflexes: \par Biceps right 3+. Biceps left 1+.  \par Triceps right 2+. Triceps left 1+.  \par Brachioradialis right 2+. Brachioradialis left 1+.  \par Patella right 2+. Patella left 1+.  \par Ankle jerk right 0+. Ankle jerk left 0+. \par Eyes: the sclera and conjunctiva were normal. \par Neck: the appearance of the neck was normal. \par Musculoskeletal: no clubbing or cyanosis of the fingernails. \par Skin: no lesions, rash\par

## 2022-11-28 ENCOUNTER — RX RENEWAL (OUTPATIENT)
Age: 67
End: 2022-11-28

## 2022-12-14 ENCOUNTER — RX RENEWAL (OUTPATIENT)
Age: 67
End: 2022-12-14

## 2022-12-14 RX ORDER — ACETAMINOPHEN 500 MG/1
500 TABLET ORAL
Qty: 60 | Refills: 0 | Status: ACTIVE | COMMUNITY
Start: 2021-08-17 | End: 1900-01-01

## 2022-12-22 ENCOUNTER — APPOINTMENT (OUTPATIENT)
Dept: NEUROLOGY | Facility: CLINIC | Age: 67
End: 2022-12-22

## 2023-01-27 ENCOUNTER — RX RENEWAL (OUTPATIENT)
Age: 68
End: 2023-01-27

## 2023-03-02 ENCOUNTER — APPOINTMENT (OUTPATIENT)
Dept: INTERNAL MEDICINE | Facility: CLINIC | Age: 68
End: 2023-03-02
Payer: MEDICARE

## 2023-03-02 VITALS
HEIGHT: 75 IN | TEMPERATURE: 97.1 F | DIASTOLIC BLOOD PRESSURE: 78 MMHG | RESPIRATION RATE: 17 BRPM | OXYGEN SATURATION: 98 % | WEIGHT: 186 LBS | HEART RATE: 79 BPM | SYSTOLIC BLOOD PRESSURE: 120 MMHG | BODY MASS INDEX: 23.13 KG/M2

## 2023-03-02 DIAGNOSIS — R21 RASH AND OTHER NONSPECIFIC SKIN ERUPTION: ICD-10-CM

## 2023-03-02 PROCEDURE — 99214 OFFICE O/P EST MOD 30 MIN: CPT

## 2023-03-02 RX ORDER — CLOTRIMAZOLE AND BETAMETHASONE DIPROPIONATE 10; .5 MG/G; MG/G
1-0.05 CREAM TOPICAL
Qty: 1 | Refills: 1 | Status: ACTIVE | COMMUNITY
Start: 2023-03-02 | End: 1900-01-01

## 2023-03-02 RX ORDER — ASPIRIN 81 MG/1
81 TABLET ORAL
Qty: 90 | Refills: 3 | Status: ACTIVE | COMMUNITY
Start: 2022-03-23 | End: 1900-01-01

## 2023-03-02 RX ORDER — QUETIAPINE FUMARATE 25 MG/1
25 TABLET ORAL
Qty: 90 | Refills: 3 | Status: ACTIVE | COMMUNITY
Start: 2022-11-09 | End: 1900-01-01

## 2023-03-03 PROBLEM — R21 RASH: Status: ACTIVE | Noted: 2023-03-02

## 2023-03-12 NOTE — HISTORY OF PRESENT ILLNESS
[FreeTextEntry1] : rash on upper right arm\par  [de-identified] : 68 y/o M presents with complaints of a rash on his upper R arm. Patient currently has stage 3 prostate cancer and is pending further treatment with Urology. Hx of HLD, HTN, prediabetes, vitamin D deficiency, GERD. He feels otherwise well and reports no other acute complaints or concerns. ROS as documented below.\par \par \par I Kaila Bancroft am scribing for and in the presence of Dr. Cheko Dobbs, the following sections: HISTORY OF PRESENT ILLNESS, PAST MEDICAL/FAMILY/SOCIAL HISTORY, REVIEW OF SYSTEMS, PHYSICAL EXAM; DISPOSITION.\par \par I personally performed the services described in the documentation, reviewed the documentation recorded by the scribe in my presence and it accurately and completely records my words and actions.\par

## 2023-03-13 ENCOUNTER — APPOINTMENT (OUTPATIENT)
Dept: NEUROLOGY | Facility: CLINIC | Age: 68
End: 2023-03-13

## 2023-05-02 NOTE — DISCHARGE NOTE PROVIDER - CARE PROVIDER_API CALL
[facility] Edmar Garcia)  Clinical Neurophysiology; Neurology  [address]  [place] [address]  Phone: [phone]  Fax: [phone]  Established Patient  Follow Up Time: 2 weeks

## 2023-07-05 ENCOUNTER — APPOINTMENT (OUTPATIENT)
Dept: INTERNAL MEDICINE | Facility: CLINIC | Age: 68
End: 2023-07-05
Payer: MEDICARE

## 2023-07-05 VITALS
TEMPERATURE: 97.8 F | OXYGEN SATURATION: 97 % | HEIGHT: 75 IN | RESPIRATION RATE: 17 BRPM | WEIGHT: 173 LBS | BODY MASS INDEX: 21.51 KG/M2 | DIASTOLIC BLOOD PRESSURE: 78 MMHG | HEART RATE: 75 BPM | SYSTOLIC BLOOD PRESSURE: 142 MMHG

## 2023-07-05 DIAGNOSIS — M54.12 RADICULOPATHY, CERVICAL REGION: ICD-10-CM

## 2023-07-05 DIAGNOSIS — R42 DIZZINESS AND GIDDINESS: ICD-10-CM

## 2023-07-05 PROCEDURE — 99214 OFFICE O/P EST MOD 30 MIN: CPT

## 2023-07-05 NOTE — PHYSICAL EXAM
[No Acute Distress] : no acute distress [Well Nourished] : well nourished [Well Developed] : well developed [Well-Appearing] : well-appearing [Normal Sclera/Conjunctiva] : normal sclera/conjunctiva [PERRL] : pupils equal round and reactive to light [EOMI] : extraocular movements intact [Normal Outer Ear/Nose] : the outer ears and nose were normal in appearance [Normal Oropharynx] : the oropharynx was normal [No JVD] : no jugular venous distention [No Lymphadenopathy] : no lymphadenopathy [Supple] : supple [Thyroid Normal, No Nodules] : the thyroid was normal and there were no nodules present [No Respiratory Distress] : no respiratory distress  [No Accessory Muscle Use] : no accessory muscle use [Clear to Auscultation] : lungs were clear to auscultation bilaterally [Normal Rate] : normal rate  [Regular Rhythm] : with a regular rhythm [Normal S1, S2] : normal S1 and S2 [No Murmur] : no murmur heard [No Carotid Bruits] : no carotid bruits [No Abdominal Bruit] : a ~M bruit was not heard ~T in the abdomen [No Varicosities] : no varicosities [Pedal Pulses Present] : the pedal pulses are present [No Edema] : there was no peripheral edema [No Palpable Aorta] : no palpable aorta [No Extremity Clubbing/Cyanosis] : no extremity clubbing/cyanosis [Soft] : abdomen soft [Non Tender] : non-tender [Non-distended] : non-distended [No Masses] : no abdominal mass palpated [No HSM] : no HSM [Normal Bowel Sounds] : normal bowel sounds [Normal Posterior Cervical Nodes] : no posterior cervical lymphadenopathy [Normal Anterior Cervical Nodes] : no anterior cervical lymphadenopathy [No CVA Tenderness] : no CVA  tenderness [Left Paraspinal ___ (level)] : ~Ulevel [unfilled] left paraspinal [Right Paraspinal ___ (level)] : ~Ulevel [unfilled] right paraspinal [Muscle Spasms, Bilateral] : bilateral muscle spasms [No Joint Swelling] : no joint swelling [Grossly Normal Strength/Tone] : grossly normal strength/tone [Coordination Grossly Intact] : coordination grossly intact [No Focal Deficits] : no focal deficits [Normal Gait] : normal gait [Deep Tendon Reflexes (DTR)] : deep tendon reflexes were 2+ and symmetric [Normal Affect] : the affect was normal [Normal Insight/Judgement] : insight and judgment were intact

## 2023-07-18 NOTE — REVIEW OF SYSTEMS
[Dizziness] : dizziness [Memory Loss] : memory loss [Negative] : Heme/Lymph [FreeTextEntry9] : neck pain
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
## 2023-07-18 NOTE — END OF VISIT
[Time Spent: ___ minutes] : I have spent [unfilled] minutes of time on the encounter. [FreeTextEntry4] : I Rita Lewis am scribing for and in the presence of Dr. Cheko Dobbs, the following sections: HISTORY OF PRESENT ILLNESS, PAST MEDICAL/FAMILY/SOCIAL HISTORY, REVIEW OF SYSTEMS, PHYSICAL EXAM; DISPOSITION.\par \par I personally performed the services described in the documentation, reviewed the documentation recorded by the scribe in my presence and it accurately and completely records my words and actions.

## 2023-09-12 ENCOUNTER — APPOINTMENT (OUTPATIENT)
Dept: INTERNAL MEDICINE | Facility: CLINIC | Age: 68
End: 2023-09-12
Payer: MEDICARE

## 2023-09-12 VITALS
SYSTOLIC BLOOD PRESSURE: 110 MMHG | HEIGHT: 75 IN | DIASTOLIC BLOOD PRESSURE: 68 MMHG | RESPIRATION RATE: 17 BRPM | HEART RATE: 88 BPM | TEMPERATURE: 97.8 F | BODY MASS INDEX: 21.51 KG/M2 | OXYGEN SATURATION: 99 % | WEIGHT: 173 LBS

## 2023-09-12 DIAGNOSIS — M25.519 CERVICALGIA: ICD-10-CM

## 2023-09-12 DIAGNOSIS — M54.2 CERVICALGIA: ICD-10-CM

## 2023-09-12 PROCEDURE — 99214 OFFICE O/P EST MOD 30 MIN: CPT

## 2023-09-12 RX ORDER — OMEPRAZOLE 20 MG/1
20 CAPSULE, DELAYED RELEASE ORAL
Qty: 30 | Refills: 1 | Status: ACTIVE | COMMUNITY
Start: 2023-09-12 | End: 1900-01-01

## 2023-09-12 RX ORDER — TIZANIDINE 4 MG/1
4 TABLET ORAL
Qty: 7 | Refills: 0 | Status: ACTIVE | COMMUNITY
Start: 2023-09-12 | End: 1900-01-01

## 2023-09-18 PROBLEM — M54.2 NECK AND SHOULDER PAIN: Status: ACTIVE | Noted: 2023-07-05

## 2023-12-05 NOTE — PATIENT PROFILE ADULT - NSPROMEDSADMININFO_GEN_A_NUR
Outreach attempt was made to schedule Follow-up Visit with Any Patterson. This was the first attempt. Contact was made, patient receive a letter on December first about a CT she had in July, saying she needed to follow up. Patient was every upset by this and ask why she was receiving this letter now and instead of in July if something was wrong. Offered patient an appointment with Any Patterson for [facility]. Patient is going to hold of at the moment.      no concerns

## 2024-03-11 ENCOUNTER — RX RENEWAL (OUTPATIENT)
Age: 69
End: 2024-03-11

## 2024-03-11 RX ORDER — LOSARTAN POTASSIUM AND HYDROCHLOROTHIAZIDE 12.5; 5 MG/1; MG/1
50-12.5 TABLET ORAL
Qty: 90 | Refills: 3 | Status: ACTIVE | COMMUNITY
Start: 2022-05-04 | End: 1900-01-01

## 2024-03-11 RX ORDER — AMLODIPINE BESYLATE 10 MG/1
10 TABLET ORAL
Qty: 90 | Refills: 3 | Status: ACTIVE | COMMUNITY
Start: 2022-02-04 | End: 1900-01-01

## 2024-03-11 RX ORDER — LEVETIRACETAM 500 MG/1
500 TABLET, FILM COATED ORAL TWICE DAILY
Qty: 180 | Refills: 3 | Status: ACTIVE | COMMUNITY
Start: 2022-11-09 | End: 1900-01-01

## 2024-03-13 ENCOUNTER — APPOINTMENT (OUTPATIENT)
Dept: INTERNAL MEDICINE | Facility: CLINIC | Age: 69
End: 2024-03-13
Payer: MEDICARE

## 2024-03-13 ENCOUNTER — NON-APPOINTMENT (OUTPATIENT)
Age: 69
End: 2024-03-13

## 2024-03-13 VITALS
HEART RATE: 94 BPM | BODY MASS INDEX: 23.44 KG/M2 | RESPIRATION RATE: 17 BRPM | DIASTOLIC BLOOD PRESSURE: 80 MMHG | OXYGEN SATURATION: 98 % | TEMPERATURE: 98.8 F | SYSTOLIC BLOOD PRESSURE: 116 MMHG | WEIGHT: 188.5 LBS | HEIGHT: 75 IN

## 2024-03-13 DIAGNOSIS — R73.03 PREDIABETES.: ICD-10-CM

## 2024-03-13 DIAGNOSIS — K21.9 GASTRO-ESOPHAGEAL REFLUX DISEASE W/OUT ESOPHAGITIS: ICD-10-CM

## 2024-03-13 DIAGNOSIS — C61 MALIGNANT NEOPLASM OF PROSTATE: ICD-10-CM

## 2024-03-13 DIAGNOSIS — E55.9 VITAMIN D DEFICIENCY, UNSPECIFIED: ICD-10-CM

## 2024-03-13 DIAGNOSIS — I10 ESSENTIAL (PRIMARY) HYPERTENSION: ICD-10-CM

## 2024-03-13 DIAGNOSIS — Z00.00 ENCOUNTER FOR GENERAL ADULT MEDICAL EXAMINATION W/OUT ABNORMAL FINDINGS: ICD-10-CM

## 2024-03-13 DIAGNOSIS — E78.5 HYPERLIPIDEMIA, UNSPECIFIED: ICD-10-CM

## 2024-03-13 DIAGNOSIS — Z13.6 ENCOUNTER FOR SCREENING FOR CARDIOVASCULAR DISORDERS: ICD-10-CM

## 2024-03-13 DIAGNOSIS — R41.89 OTHER SYMPTOMS AND SIGNS INVOLVING COGNITIVE FUNCTIONS AND AWARENESS: ICD-10-CM

## 2024-03-13 PROCEDURE — 36415 COLL VENOUS BLD VENIPUNCTURE: CPT

## 2024-03-13 PROCEDURE — G0439: CPT

## 2024-03-13 PROCEDURE — 93000 ELECTROCARDIOGRAM COMPLETE: CPT

## 2024-03-13 PROCEDURE — 99214 OFFICE O/P EST MOD 30 MIN: CPT | Mod: 25

## 2024-03-17 ENCOUNTER — RX RENEWAL (OUTPATIENT)
Age: 69
End: 2024-03-17

## 2024-03-18 RX ORDER — ASPIRIN 81 MG/1
81 TABLET, COATED ORAL
Qty: 90 | Refills: 3 | Status: ACTIVE | COMMUNITY
Start: 2024-03-17 | End: 1900-01-01

## 2024-04-01 LAB
25(OH)D3 SERPL-MCNC: 36.6 NG/ML
ALBUMIN SERPL ELPH-MCNC: 4.7 G/DL
ALP BLD-CCNC: 95 U/L
ALT SERPL-CCNC: 8 U/L
ANION GAP SERPL CALC-SCNC: 15 MMOL/L
APPEARANCE: CLEAR
AST SERPL-CCNC: 20 U/L
BASOPHILS # BLD AUTO: 0.09 K/UL
BASOPHILS NFR BLD AUTO: 1.6 %
BILIRUB SERPL-MCNC: 0.3 MG/DL
BILIRUBIN URINE: NEGATIVE
BLOOD URINE: NEGATIVE
BUN SERPL-MCNC: 19 MG/DL
CALCIUM SERPL-MCNC: 10.1 MG/DL
CHLORIDE SERPL-SCNC: 97 MMOL/L
CHOLEST SERPL-MCNC: 269 MG/DL
CO2 SERPL-SCNC: 24 MMOL/L
COLOR: YELLOW
CREAT SERPL-MCNC: 1.12 MG/DL
EGFR: 72 ML/MIN/1.73M2
EOSINOPHIL # BLD AUTO: 0.18 K/UL
EOSINOPHIL NFR BLD AUTO: 3.2 %
ESTIMATED AVERAGE GLUCOSE: 120 MG/DL
GLUCOSE QUALITATIVE U: NEGATIVE MG/DL
GLUCOSE SERPL-MCNC: 96 MG/DL
HBA1C MFR BLD HPLC: 5.8 %
HCT VFR BLD CALC: 33.1 %
HDLC SERPL-MCNC: 127 MG/DL
HGB BLD-MCNC: 12 G/DL
IMM GRANULOCYTES NFR BLD AUTO: 0.4 %
KETONES URINE: NEGATIVE MG/DL
LDLC SERPL CALC-MCNC: 128 MG/DL
LEUKOCYTE ESTERASE URINE: NEGATIVE
LYMPHOCYTES # BLD AUTO: 1.54 K/UL
LYMPHOCYTES NFR BLD AUTO: 27 %
MAN DIFF?: NORMAL
MCHC RBC-ENTMCNC: 28.5 PG
MCHC RBC-ENTMCNC: 36.3 GM/DL
MCV RBC AUTO: 78.6 FL
MONOCYTES # BLD AUTO: 0.79 K/UL
MONOCYTES NFR BLD AUTO: 13.8 %
NEUTROPHILS # BLD AUTO: 3.09 K/UL
NEUTROPHILS NFR BLD AUTO: 54 %
NITRITE URINE: NEGATIVE
NONHDLC SERPL-MCNC: 142 MG/DL
PH URINE: 6
PLATELET # BLD AUTO: 224 K/UL
POTASSIUM SERPL-SCNC: 4 MMOL/L
PROT SERPL-MCNC: 7.5 G/DL
PROTEIN URINE: NEGATIVE MG/DL
PSA FREE FLD-MCNC: NORMAL %
PSA FREE SERPL-MCNC: <0.01 NG/ML
PSA SERPL-MCNC: 0.06 NG/ML
RBC # BLD: 4.21 M/UL
RBC # FLD: 14.2 %
SODIUM SERPL-SCNC: 136 MMOL/L
SPECIFIC GRAVITY URINE: 1.02
TRIGL SERPL-MCNC: 91 MG/DL
TSH SERPL-ACNC: 1.48 UIU/ML
UROBILINOGEN URINE: 1 MG/DL
WBC # FLD AUTO: 5.71 K/UL

## 2024-04-01 RX ORDER — ATORVASTATIN CALCIUM 40 MG/1
40 TABLET, FILM COATED ORAL
Qty: 90 | Refills: 3 | Status: ACTIVE | COMMUNITY
Start: 2022-03-23 | End: 1900-01-01

## 2024-04-14 PROBLEM — C61 PROSTATE CANCER: Status: ACTIVE | Noted: 2023-03-02

## 2024-04-14 PROBLEM — R41.89 COGNITIVE CHANGES: Status: ACTIVE | Noted: 2021-08-04

## 2024-04-14 PROBLEM — R73.03 PREDIABETES: Status: ACTIVE | Noted: 2021-08-04

## 2024-04-14 PROBLEM — E55.9 VITAMIN D DEFICIENCY: Status: ACTIVE | Noted: 2021-08-04

## 2024-04-14 PROBLEM — E78.5 HYPERLIPIDEMIA: Status: ACTIVE | Noted: 2021-08-04

## 2024-04-14 PROBLEM — I10 HYPERTENSION: Status: ACTIVE | Noted: 2021-08-04

## 2024-04-14 PROBLEM — K21.9 CHRONIC GERD: Status: ACTIVE | Noted: 2022-03-23

## 2024-04-14 NOTE — HISTORY OF PRESENT ILLNESS
[FreeTextEntry1] : No acute concerns [de-identified] : 68 y/o M presents or annual wellness visit. He feels otherwise well and reports no other acute complaints or concerns. ROS as documented below.

## 2024-04-14 NOTE — ASSESSMENT
[FreeTextEntry1] : , , Preventative:  Counseled on health promotion and disease prevention.  EKG and wellness labs done Gastroenterology referral provided for colonoscopy  Heart Screen:  EKG nsr  Chronic GERD  Counseled on dietary modifications. Avoid tomato products, mint, citrus (drinks / fruit),     fried fatty foods, caffeine, chocolate. Avoid food / drink ,2 hours prior to bedtime / napping     -Rx Omeprazole     -Giovanni / Rolaids PRN     -Gastro eval / H Pylori testing if not improving  Prediabetes:  HbA1C % Counseled patient to cut down on processed sugar and carbohydrates. Increase Aerobic exercise and resistance training.    Creatinine Elevation:  C level normalized since prior visit.  Cognitive changes  Follow-up with Neurology advised.     . Prostate cancer (185) (C61) Completed radiation treatment     Followup with Hem/Onc  Anemia:  Mild.  monitoring.   Hyperlipidemia (272.4) (E78.5) Refill Atorvastatin 40mg     Counseled on diet, exercise and lifestyle modifications. Advised a diet centered around     whole plant based foods. Vegetables, fruits, legumes, grains, nuts. Advised limiting     intake of refined processed foods (refined white flour products, refined sugar, soda,     juice). Limit intake of meat, dairy, eggs, oil.  Hypertension (401.9) (I10) Refill Amlodipine, Losartan     -Advised low salt diet, regular exercise, weight loss.     -Counseled on importance of medication compliance.     -Advised to keep BP log at home- will review at next visit.     -Follow up in office for BP check.

## 2024-07-02 ENCOUNTER — APPOINTMENT (OUTPATIENT)
Dept: INTERNAL MEDICINE | Facility: CLINIC | Age: 69
End: 2024-07-02
Payer: MEDICARE

## 2024-07-02 VITALS
DIASTOLIC BLOOD PRESSURE: 70 MMHG | HEART RATE: 80 BPM | RESPIRATION RATE: 17 BRPM | OXYGEN SATURATION: 98 % | TEMPERATURE: 97.8 F | SYSTOLIC BLOOD PRESSURE: 110 MMHG | HEIGHT: 75 IN | BODY MASS INDEX: 23.38 KG/M2 | WEIGHT: 188 LBS

## 2024-07-02 DIAGNOSIS — M54.50 LOW BACK PAIN, UNSPECIFIED: ICD-10-CM

## 2024-07-02 PROCEDURE — 99213 OFFICE O/P EST LOW 20 MIN: CPT

## 2024-07-03 RX ORDER — LIDOCAINE 5% 700 MG/1
5 PATCH TOPICAL
Qty: 20 | Refills: 3 | Status: ACTIVE | COMMUNITY
Start: 2024-07-02

## 2024-07-23 NOTE — ED ADULT NURSE NOTE - AS SC BRADEN MOISTURE
Start taking Ozempic:  0.25mg once weekly injection for first 4  weeks then  0.5mg once weekly injection for next 6 weeks then   1mg once weekly injection for 4 weeks then  2mg once weekly injection thereafter.     Check blood sugars 3-4/day before each meal and bedtime    Farxiga (Dapagliflozin) 10 mg daily  Lantus 20 units AM 60 units PM  Humalog   Blood Glucose (mg/dL) Breakfast Lunch Dinner   70-99 7 0 7   100-150  14 3 14   151 -200  18 8 18   201 -250  22 11 22   251 and above 24 14 24   Rosuvastatin 20 mg daily    This category of medications are shown to improve A1C by 1-2 %, and are associated with weight loss about 6-12 lbs, increase in satiety (feeling full after eating food).    Side effects of Ozempic include (but not limited to) the following:   Most common side effect is nausea, which may occur in about 10% of patients taking this. Nausea occurs due to delay in gastric emptying (food being digested slower) so it is recommended to eat smaller portions and low fat/low carbohydrate diet in order to avoid nausea. You may continue taking the medication if nausea is mild, as it likely will get better over time. However, if you have severe nausea or vomiting, make sure that you are well-hydrated and stop the medication and inform us.  Extremely rarely, these are associated with pancreatitis/pancreatitic cancer, gallbladder inflammation (in rat studies, in human studies, no proven increased risk or not), and rare cancer called medullary thyroid cancer but causal relationship is not proven, and overall these medications are deemed safe to use in patients who has not had history of pancreatitis/pancreatic cancer or medullary thyroid cancer in themselves or immediate family members. Ozempic is very rarely associated with worsening of underlying retinopathy deemed due to dramatic improvement in blood sugars, but most optometrist/ophthalmologists are ok with use as these can be transient. Nonetheless, please  consult your optometrist/ophthalmologist for safety of Ozempic use if you are concerned.       (4) rarely moist

## 2024-08-06 ENCOUNTER — APPOINTMENT (OUTPATIENT)
Dept: INTERNAL MEDICINE | Facility: CLINIC | Age: 69
End: 2024-08-06

## 2025-05-05 ENCOUNTER — RX RENEWAL (OUTPATIENT)
Age: 70
End: 2025-05-05

## 2025-05-21 ENCOUNTER — APPOINTMENT (OUTPATIENT)
Dept: INTERNAL MEDICINE | Facility: CLINIC | Age: 70
End: 2025-05-21

## 2025-05-21 ENCOUNTER — NON-APPOINTMENT (OUTPATIENT)
Age: 70
End: 2025-05-21

## 2025-05-21 VITALS
OXYGEN SATURATION: 96 % | HEIGHT: 75 IN | RESPIRATION RATE: 16 BRPM | SYSTOLIC BLOOD PRESSURE: 128 MMHG | HEART RATE: 89 BPM | DIASTOLIC BLOOD PRESSURE: 80 MMHG | BODY MASS INDEX: 22.63 KG/M2 | TEMPERATURE: 97.7 F | WEIGHT: 182 LBS

## 2025-05-21 DIAGNOSIS — Z13.39 ENCOUNTER FOR SCREENING EXAM FOR OTHER MENTAL HEALTH AND BEHAVIORAL DISORDERS: ICD-10-CM

## 2025-05-21 DIAGNOSIS — E78.5 HYPERLIPIDEMIA, UNSPECIFIED: ICD-10-CM

## 2025-05-21 DIAGNOSIS — R63.4 ABNORMAL WEIGHT LOSS: ICD-10-CM

## 2025-05-21 DIAGNOSIS — R56.9 UNSPECIFIED CONVULSIONS: ICD-10-CM

## 2025-05-21 DIAGNOSIS — Z71.89 OTHER SPECIFIED COUNSELING: ICD-10-CM

## 2025-05-21 DIAGNOSIS — I10 ESSENTIAL (PRIMARY) HYPERTENSION: ICD-10-CM

## 2025-05-21 DIAGNOSIS — Z00.00 ENCOUNTER FOR GENERAL ADULT MEDICAL EXAMINATION W/OUT ABNORMAL FINDINGS: ICD-10-CM

## 2025-05-21 DIAGNOSIS — M54.12 RADICULOPATHY, CERVICAL REGION: ICD-10-CM

## 2025-05-21 DIAGNOSIS — N17.9 ACUTE KIDNEY FAILURE, UNSPECIFIED: ICD-10-CM

## 2025-05-21 DIAGNOSIS — Z13.31 ENCOUNTER FOR SCREENING FOR DEPRESSION: ICD-10-CM

## 2025-05-21 DIAGNOSIS — M54.30 SCIATICA, UNSPECIFIED SIDE: ICD-10-CM

## 2025-05-21 DIAGNOSIS — C61 MALIGNANT NEOPLASM OF PROSTATE: ICD-10-CM

## 2025-05-21 DIAGNOSIS — G62.9 POLYNEUROPATHY, UNSPECIFIED: ICD-10-CM

## 2025-05-21 DIAGNOSIS — R41.89 OTHER SYMPTOMS AND SIGNS INVOLVING COGNITIVE FUNCTIONS AND AWARENESS: ICD-10-CM

## 2025-05-21 DIAGNOSIS — Z01.810 ENCOUNTER FOR PREPROCEDURAL CARDIOVASCULAR EXAMINATION: ICD-10-CM

## 2025-05-21 DIAGNOSIS — K21.9 GASTRO-ESOPHAGEAL REFLUX DISEASE W/OUT ESOPHAGITIS: ICD-10-CM

## 2025-05-21 PROCEDURE — 99203 OFFICE O/P NEW LOW 30 MIN: CPT | Mod: 25

## 2025-05-21 PROCEDURE — 99497 ADVNCD CARE PLAN 30 MIN: CPT | Mod: 25

## 2025-05-21 PROCEDURE — G0296 VISIT TO DETERM LDCT ELIG: CPT

## 2025-05-21 PROCEDURE — G0444 DEPRESSION SCREEN ANNUAL: CPT | Mod: 59

## 2025-05-21 PROCEDURE — G0447 BEHAVIOR COUNSEL OBESITY 15M: CPT | Mod: 59

## 2025-05-21 RX ORDER — MEGESTROL ACETATE 40 MG/ML
40 SUSPENSION ORAL
Qty: 1 | Refills: 2 | Status: ACTIVE | COMMUNITY
Start: 2025-05-21 | End: 1900-01-01

## 2025-05-21 RX ORDER — GABAPENTIN 100 MG/1
100 CAPSULE ORAL
Qty: 30 | Refills: 1 | Status: ACTIVE | COMMUNITY
Start: 2025-05-21 | End: 1900-01-01

## 2025-05-22 ENCOUNTER — NON-APPOINTMENT (OUTPATIENT)
Age: 70
End: 2025-05-22

## 2025-05-22 LAB
25(OH)D3 SERPL-MCNC: 24.5 NG/ML
ALBUMIN SERPL ELPH-MCNC: 4.4 G/DL
ALP BLD-CCNC: 78 U/L
ALT SERPL-CCNC: 13 U/L
ANION GAP SERPL CALC-SCNC: 15 MMOL/L
APPEARANCE: CLEAR
AST SERPL-CCNC: 30 U/L
BASOPHILS # BLD AUTO: 0.04 K/UL
BASOPHILS NFR BLD AUTO: 0.8 %
BILIRUB SERPL-MCNC: 0.3 MG/DL
BILIRUBIN URINE: NEGATIVE
BLOOD URINE: NEGATIVE
BUN SERPL-MCNC: 11 MG/DL
CALCIUM SERPL-MCNC: 10.1 MG/DL
CHLORIDE SERPL-SCNC: 104 MMOL/L
CHOLEST SERPL-MCNC: 226 MG/DL
CO2 SERPL-SCNC: 25 MMOL/L
COLOR: NORMAL
CREAT SERPL-MCNC: 1.04 MG/DL
EGFRCR SERPLBLD CKD-EPI 2021: 78 ML/MIN/1.73M2
EOSINOPHIL # BLD AUTO: 0.18 K/UL
EOSINOPHIL NFR BLD AUTO: 3.5 %
ESTIMATED AVERAGE GLUCOSE: 108 MG/DL
GGT SERPL-CCNC: 29 U/L
GLUCOSE QUALITATIVE U: NEGATIVE MG/DL
GLUCOSE SERPL-MCNC: 108 MG/DL
HBA1C MFR BLD HPLC: 5.4 %
HCT VFR BLD CALC: 34.9 %
HDLC SERPL-MCNC: 129 MG/DL
HGB BLD-MCNC: 12.3 G/DL
IMM GRANULOCYTES NFR BLD AUTO: 0.4 %
KETONES URINE: ABNORMAL MG/DL
LDLC SERPL-MCNC: 90 MG/DL
LEUKOCYTE ESTERASE URINE: NEGATIVE
LYMPHOCYTES # BLD AUTO: 1.17 K/UL
LYMPHOCYTES NFR BLD AUTO: 22.9 %
MAN DIFF?: NORMAL
MCHC RBC-ENTMCNC: 29.4 PG
MCHC RBC-ENTMCNC: 35.2 G/DL
MCV RBC AUTO: 83.3 FL
MONOCYTES # BLD AUTO: 0.72 K/UL
MONOCYTES NFR BLD AUTO: 14.1 %
NEUTROPHILS # BLD AUTO: 2.97 K/UL
NEUTROPHILS NFR BLD AUTO: 58.3 %
NITRITE URINE: NEGATIVE
NONHDLC SERPL-MCNC: 97 MG/DL
PH URINE: 6
PLATELET # BLD AUTO: 230 K/UL
POTASSIUM SERPL-SCNC: 4 MMOL/L
PROT SERPL-MCNC: 6.9 G/DL
PROTEIN URINE: NORMAL MG/DL
PSA FREE FLD-MCNC: 8 %
PSA FREE SERPL-MCNC: 0.04 NG/ML
PSA SERPL-MCNC: 0.49 NG/ML
RBC # BLD: 4.19 M/UL
RBC # FLD: 19.5 %
SODIUM SERPL-SCNC: 144 MMOL/L
SPECIFIC GRAVITY URINE: 1.03
TRIGL SERPL-MCNC: 42 MG/DL
TSH SERPL-ACNC: 0.98 UIU/ML
UROBILINOGEN URINE: 0.2 MG/DL
WBC # FLD AUTO: 5.1 K/UL

## 2025-05-24 PROBLEM — Z00.00 ENCOUNTER FOR PREVENTATIVE ADULT HEALTH CARE EXAMINATION: Status: ACTIVE | Noted: 2025-05-21

## 2025-05-24 PROBLEM — Z01.810 ENCOUNTER FOR PREPROCEDURAL CARDIOVASCULAR EXAMINATION: Status: ACTIVE | Noted: 2025-05-21 | Resolved: 2025-06-04
